# Patient Record
Sex: FEMALE | Race: WHITE | NOT HISPANIC OR LATINO | Employment: OTHER | URBAN - METROPOLITAN AREA
[De-identification: names, ages, dates, MRNs, and addresses within clinical notes are randomized per-mention and may not be internally consistent; named-entity substitution may affect disease eponyms.]

---

## 2017-10-18 ENCOUNTER — HOSPITAL ENCOUNTER (OUTPATIENT)
Dept: MAMMOGRAPHY | Facility: CLINIC | Age: 67
Discharge: HOME | End: 2017-10-18
Attending: OBSTETRICS & GYNECOLOGY | Admitting: OBSTETRICS & GYNECOLOGY
Payer: MEDICARE

## 2017-10-18 DIAGNOSIS — Z12.39 BREAST CANCER SCREENING: ICD-10-CM

## 2017-10-18 PROCEDURE — G0202 SCR MAMMO BI INCL CAD: HCPCS

## 2018-11-13 ENCOUNTER — HOSPITAL ENCOUNTER (OUTPATIENT)
Dept: MAMMOGRAPHY | Facility: CLINIC | Age: 68
Discharge: HOME | End: 2018-11-13
Attending: OBSTETRICS & GYNECOLOGY | Admitting: OBSTETRICS & GYNECOLOGY
Payer: MEDICARE

## 2018-11-13 DIAGNOSIS — Z12.31 VISIT FOR SCREENING MAMMOGRAM: ICD-10-CM

## 2018-11-13 PROCEDURE — 77067 SCR MAMMO BI INCL CAD: CPT

## 2019-06-02 ENCOUNTER — APPOINTMENT (OUTPATIENT)
Dept: GENERAL RADIOLOGY | Facility: CLINIC | Age: 69
End: 2019-06-02
Attending: PHYSICIAN ASSISTANT
Payer: COMMERCIAL

## 2019-06-02 ENCOUNTER — HOSPITAL ENCOUNTER (EMERGENCY)
Facility: CLINIC | Age: 69
Discharge: HOME | End: 2019-06-02
Attending: PHYSICIAN ASSISTANT | Admitting: PHYSICIAN ASSISTANT
Payer: COMMERCIAL

## 2019-06-02 VITALS
RESPIRATION RATE: 16 BRPM | SYSTOLIC BLOOD PRESSURE: 154 MMHG | HEART RATE: 88 BPM | TEMPERATURE: 97.7 F | DIASTOLIC BLOOD PRESSURE: 94 MMHG | OXYGEN SATURATION: 95 %
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
## 2019-06-02 DIAGNOSIS — S42.021A CLOSED DISPLACED FRACTURE OF SHAFT OF RIGHT CLAVICLE, INITIAL ENCOUNTER: ICD-10-CM

## 2019-06-02 PROCEDURE — 73080 X-RAY EXAM OF ELBOW: CPT | Mod: RT

## 2019-06-02 PROCEDURE — 99284 EMERGENCY DEPT VISIT MOD MDM: CPT

## 2019-06-02 PROCEDURE — 73030 X-RAY EXAM OF SHOULDER: CPT | Mod: RT

## 2019-06-02 PROCEDURE — A9270 NON-COVERED ITEM OR SERVICE: HCPCS | Performed by: PHYSICIAN ASSISTANT

## 2019-06-02 PROCEDURE — 25000132 ZZH RX MED GY IP 250 OP 250 PS 637: Performed by: PHYSICIAN ASSISTANT

## 2019-06-02 PROCEDURE — 71101 X-RAY EXAM UNILAT RIBS/CHEST: CPT | Mod: RT

## 2019-06-02 RX ORDER — IBUPROFEN 600 MG/1
600 TABLET, FILM COATED ORAL ONCE
Status: COMPLETED | OUTPATIENT
Start: 2019-06-02 | End: 2019-06-02

## 2019-06-02 RX ORDER — TRAMADOL HYDROCHLORIDE 50 MG/1
25-50 TABLET ORAL EVERY 6 HOURS PRN
Qty: 6 TABLET | Refills: 0 | Status: SHIPPED | OUTPATIENT
Start: 2019-06-02 | End: 2019-06-05

## 2019-06-02 RX ORDER — ACETAMINOPHEN 325 MG/1
650 TABLET ORAL ONCE
Status: COMPLETED | OUTPATIENT
Start: 2019-06-02 | End: 2019-06-02

## 2019-06-02 RX ADMIN — IBUPROFEN 600 MG: 600 TABLET ORAL at 11:27

## 2019-06-02 RX ADMIN — ACETAMINOPHEN 650 MG: 325 TABLET, FILM COATED ORAL at 11:27

## 2019-06-02 ASSESSMENT — ENCOUNTER SYMPTOMS
HEADACHES: 0
JOINT SWELLING: 0
NUMBNESS: 0
FEVER: 0
WEAKNESS: 0
SHORTNESS OF BREATH: 0
ARTHRALGIAS: 1

## 2019-06-02 NOTE — DISCHARGE INSTRUCTIONS
Take Tylenol 1000 mg every 8 hours.  Take Tylenol 600 mg every 6 hours.  Ice as well(help with swelling and pain). If you have breakthrough pain, and take the tramadol.      Discharge Instructions  Extremity Injury    You were seen today for an injury to an extremity (arm, hand, leg, or foot). You may have a bruise, strain, or fracture (broken bone).    Generally, every Emergency Department visit should have a follow-up clinic visit with either a primary or a specialty clinic/provider. Please follow-up as instructed by your emergency provider today.  Return to the Emergency Department right away if:  Your pain seems to change or get worse or there is pain in a new area that wasn?t evaluated today.  Your extremity becomes pale, cool, blue, or numb or tingling past the injury.  You have more drainage, redness or pain in the area of the cut or abrasion.  You have pain that you cannot control with the medicine recommended or prescribed here, or you have pain that seems too much for your injury.  Your child (who is injured) will not stop crying or is much more fussy than normal.  You have new symptoms or anything that worries you.    What to Expect:  Your swelling and pain may be worse the day after your injury, but should not be severe and should start getting better after that. You should not have new symptoms and your pain should not get worse.  You may start to get a bruise over the injured area or below the injured area (bruising can follow gravity).  Your movement and strength should get better with time.  Some injuries may not show up until after you have left the Emergency Department so it is important to follow-up as directed.  Your injury may prevent you from working.  Follow-up with your regular provider to get a work release note.  Pain medications or your injury may make it unsafe to drive or operate machinery.    Home Care:  RICE: Rest, Ice, Compression, Elevation  Rest: Rest your injured area for at least  1-2 days. After that you may start using your extremity again as long as there is not too much pain.   Ice: Apply ice your injured area for 15 minutes at a time, at least 3 times a day. Use a cloth between the ice bag and your skin to prevent frostbite. Do not sleep with an ice pack or heating pad on, since this can cause burns or skin injury.  Compression: You may use an elastic bandage (Ace  Wrap) if it makes you more comfortable. Wrap it just tight enough to provide light compression, like a new pair of socks feels. Loosen the bandage if you have swelling past the bandage.  Elevation: Raise the injured area above the level of your heart as much as possible in the first 1-2 days.    Use Tylenol  (acetaminophen), Motrin (ibuprofen), or Advil  (ibuprofen) for your pain unless you have an allergy or are told not to use these medications by your provider.  Take the medications as instructed on the package. Tylenol  (acetaminophen) is in many prescription medicines and non-prescription medicines--check all of your medicines to be sure you aren?t taking more than 3000 mg per day.  Please follow any other instructions that were discussed with you by your provider.    Stretching/Exercises:  You may have been provided with instructions for stretching or exercises. If your injury was to your arm or shoulder and your provider put you in a sling or an immobilizer, it is important that you take off your immobilizer within 3 days and stretch/move your shoulder, unless your provider specifically tells you to not move your shoulder.  This is to prevent further injury such as a ?frozen shoulder?.     If you were given a prescription for medicine here today, be sure to read all of the information (including the package insert) that comes with your prescription.  This will include important information about the medicine, its side effects, and any warnings that you need to know about.  The pharmacist who fills the prescription can  provide more information and answer questions you may have about the medicine.  If you have questions or concerns that the pharmacist cannot address, please call or return to the Emergency Department.     Remember that you can always come back to the Emergency Department if you are not able to see your regular provider in the amount of time listed above, if you get any new symptoms, or if there is anything that worries you.

## 2019-06-02 NOTE — ED AVS SNAPSHOT
[facility] Emergency Department  [address]  [facility] [address]  Phone:  [phone]  Fax:  [phone]                                    Brii Cook   MRN: [id number]    Department:  [facility] Emergency Department   Date of Visit:  6/2/2019           After Visit Summary Signature Page    I have received my discharge instructions, and my questions have been answered. I have discussed any challenges I see with this plan with the nurse or doctor.    ..........................................................................................................................................  Patient/Patient Representative Signature      ..........................................................................................................................................  Patient Representative Print Name and Relationship to Patient    ..................................................               ................................................  Date                                   Time    ..........................................................................................................................................  Reviewed by Signature/Title    ...................................................              ..............................................  Date                                               Time          22EPIC Rev 08/18

## 2019-06-02 NOTE — ED PROVIDER NOTES
History     Chief Complaint:  Fall, Collar Bone Deformity    HPI   Brii Cook is an otherwise healthy 68 year old female who presents with pain in her right collar bone, ribcage, and elbow after a mechanical fall. The patient reports that she was taking a walk outside this morning when she tripped on a crack in the sidewalk and fell forward. She states that she braced herself with her right arm and stumbled for a bit before ultimately landing on her right side. She denies head injury or loss of consciousness. However, the patient noted significant pain in her right collar bone and right ribcage following the fall, so she stood up and went back to her home where she called EMS. She denies any issues with ambulation. En route to the ED, the patient declined any pain medications. Currently, she states it is painful to breathe secondary to the pain in her ribcage, but denies SOB. The patient also notes significant pain in her right collar bone, but denies shortness of breath, right wrist or shoulder pain, pain in her left arm, numbness, tingling, or other acute symptoms or injuries. She also has an abrasion to her right elbow, but denies any bony tenderness. She denies taking any blood thinners.    Allergies:  Sulfa drugs  Penicillins     Medications:    Trazodone  Wellbutrin    Past Medical History:    Depression  Recurrent corneal erosion    Past Surgical History:    Lithotripsy  Appendectomy    Family History:    History reviewed. No pertinent family history.     Social History:  PCP: Marge Rivas  Presents to the ED with her spouse  Marital Status:  [2]     Review of Systems   Constitutional: Negative for fever.   Respiratory: Negative for shortness of breath.    Musculoskeletal: Positive for arthralgias. Negative for joint swelling.        Right collar bone and ribcage pain   Neurological: Negative for syncope, weakness, numbness and headaches.   All other systems reviewed and are  negative.    Physical Exam     Patient Vitals for the past 24 hrs:   BP Temp Temp src Pulse Heart Rate Resp SpO2   06/02/19 1120 (!) 154/94 97.7  F (36.5  C) Oral 88 88 16 95 %     Physical Exam  General: Alert and oriented.  Appears uncomfortable.  Head:  The scalp, face, and head appear normal.  No evidence of head injury.  Eyes:  Conjunctivae and sclerae are normal    ENT:    The oropharynx is normal    Uvula is in the midline    Neck:  Normal range of motion    There is no midline cervical spine pain/tenderness   CV:  Regular rate and rhythm     Normal S1/S2    Radial pulse intact right wrist.  Capillary refill is brisk in all digits of the right hand.  Resp:  Lungs are clear to auscultation    Non-labored    No rales or wheezing     Equal air entry throughout.  GI:  Abdomen is soft, non-distended    No abdominal tenderness   MS:  Tenderness to the right mid clavicle, with obvious deformity and swelling.  No tenting noted.  No tenderness to the right shoulder, or humerus.  There is no bony tenderness to the right elbow, forearm, or wrist.  No hand or finger tenderness.  Patient can hold fingers and resisted abduction, make the okay sign and hold against resistance, and has good strength with wrist extension in the right upper extremity.  Patient is able to flex and extend at the wrist, and elbow.  Patient is unable to range her shoulder secondary to pain.  Patient has tenderness to the right anferior/anterior ribs.  No flail chest.  No crepitus, ecchymosis, or obvious step-off.  Skin:  Swelling and ecchymosis, and obvious deformity noted to the right clavicle.  Mild swelling noted to the right lateral proximal forearm/elbow.  Neuro: Speech is normal and fluent.  Sensation intact throughout right upper extremity.    Emergency Department Course     Imaging:  Radiographic findings were communicated with the patient and family who voiced understanding of the findings.    XR Shoulder Right G/E 3 Views  There is an  acute midshaft clavicular fracture with  overriding of fracture fragments. The glenohumeral joint space appears  normal.  As read by Radiology.    Elbow XR, G/E views, right  Normal.  As read by Radiology.    Ribs XR, unilat 3 views + PA chest, right  Midshaft clavicular fracture with overriding fracture  fragments present. No pneumothorax. Calcified granuloma seen in the  right lung base. Heart size and pulmonary vasculature are within  normal limits for AP supine technique. Lungs are clear. Detail views  of the right ribs do not show any evidence for any acute rib fracture.  As read by Radiology.    Interventions:  1127: Tylenol 650 mg PO  1127: Ibuprofen 600 mg PO    Emergency Department Course:  The patient arrived in the emergency department via EMS.  Past medical records, nursing notes, and vitals reviewed.  1125: I performed an exam of the patient and obtained history, as documented above.  The patient was sent for a right shoulder X-ray, a right elbow x-ray, and a right rib x-ray while in the emergency department, findings above.    1236: I rechecked the patient. Findings and plan explained to the Patient. Patient discharged home with instructions regarding supportive care, medications, and reasons to return. The importance of close follow-up was reviewed.      Impression & Plan    Medical Decision Making:  Brii Cook is an otherwise healthy 68 year old female presents for evaluation after a mechanical fall.  She states that she tripped over uneven sidewalk, and landed on her right side.  She complains of right clavicular, and right anterior rib pain with movement.  On exam, she appears uncomfortable, but refuses any pain medications.  There is an obvious deformity to the right clavicle, and mild swelling to the right elbow. She also has tenderness to the right anterior/inferior rib cage.  Patient is equal air entry throughout, doubt pneumothorax.  Patient has no lower extremity complaints or left  upper extremity complaints.  Patient can range the wrist and elbow without difficulty.  X-ray of the right shoulder demonstrates a midshaft clavicular fracture, but no evidence of dislocation.  X-ray of the chest, right ribs negative for any obvious displaced fracture.  X-ray of the elbow is negative for any abnormality.  CMS is intact.  She did not hit her head and is not anticoagulated.  C-spine is clinically.  Therefore I do not believe further imaging/CT is needed at this time.  Given the clavicular fracture, Dr. Felder was asked to staff this patient with me.  Please refer to his note for further details and fracture care.  Ultimately, the patient was placed in a sling for comfort.  She has no other symptoms or complaints to suggest need for further work-up at this time.  She is asked to take Tylenol and ibuprofen and was given a prescription for tramadol for any breakthrough pain.  She also asked to ice the injured area.  She is asked to follow-up with orthopedics in 2 to 3 days for recheck.  She is asked return immediately for any uncontrolled pain, weakness, numbness, tingling, or any other concerns.  All questions were answered prior to discharge.  The patient understands and agrees to this plan.  Critical Care time:  none    Diagnosis:    ICD-10-CM   1. Fall, initial encounter W19.XXXA   2. Closed displaced fracture of shaft of right clavicle, initial encounter S42.021A     Disposition: Discharged to home    Discharge Medications:  traMADol 50 MG tablet  Commonly known as:  ULTRAM  25-50 mg, Oral, EVERY 6 HOURS PRN       Henrik Ulloa  6/2/2019   [facility] EMERGENCY DEPARTMENT  Henrik MORALES am serving as a scribe at 11:25 AM on 6/2/2019 to document services personally performed by Park Mueller PA-C based on my observations and the provider's statements to me.      Park Mueller PA-C  06/02/19 [id number]

## 2019-06-02 NOTE — ED NOTES
Bed: ED27  Expected date: 6/2/19  Expected time: 11:07 AM  Means of arrival:   Comments:  bv-3 fall collar bone frac?

## 2019-06-02 NOTE — ED NOTES
"Pt requested only to be given tylenol and ibuprofen because she is sensitive to stronger medications. When writer went to administered meds pt stated \"both at the same time? Oh that will knock me out.\" Writer explained that tylenol and ibuprofen are both safe to be given at the same time and that they are non-narcotic and therefore should not have any sort of sedative effect. Pt verbalized understanding.     Pt visibly very uncomfortable with any movement, writer advised pt to notify if she needed any stronger pain medication. Pt verbalized understanding.   "

## 2019-06-02 NOTE — ED TRIAGE NOTES
Pt arrives via EMS. Pt was walking and tripped over a crack in the sidewalk, now c/o right arm pain, shoulder pain, and right sided rib pain. Pt alert, oriented x3 ABCs intact

## 2019-06-02 NOTE — ED PROVIDER NOTES
Emergency Department Attending Supervision Note  6/2/2019  12:51 PM      I evaluated this patient in conjunction with Park Mueller PA-C       Briefly, the patient presented with right shoulder and clavicular pain after tripping and falling and landing on the right shoulder.  Patient denies any numbness tingling or weakness.  She has normal range of motion without pain of the right elbow wrist hand and fingers.  No syncope.  She denies any head injury or loss of consciousness.  No shortness of breath or chest pain.  She denies any other extremity injuries.  No neck pain or back pain.      On my exam, patient is well-appearing alert and oriented appropriately.  Strength is 5 out of 5 and intact in the bilateral upper extremities.  Obvious deformity and swelling over the distal right clavicle.  The remainder of the right upper extremity is otherwise nontender with full range of motion.  Radial pulses 2+ and symmetric bilaterally.   strength 5 out of 5 and intact.  Strength and sensation intact in all peripheral distributions of the hand.  No numbness over the right lateral deltoid.    Results:  All ED results are personally reviewed by myself.  There is an acute midshaft clavicular fracture with overriding of the fracture fragments.  The glenohumeral joint appears normal.    ED course:  Patient was seen and evaluated with Park.  ED work-up was performed.  Radiographs consistent with right midshaft clavicular fracture.  No evidence of neurovascular compromise or injury.  No head injury or evidence of other significant traumatic injuries to the thorax or abdomen.  Patient was provided with sling for comfort and recommended to follow-up with orthopedics and her primary care physician.    My impression is acute right midshaft clavicular fracture        Diagnosis    ICD-10-CM    1. Fall, initial encounter W19.XXXA    2. Closed displaced fracture of shaft of right clavicle, initial encounter S42.021A              Pravin Felder MD  06/02/19 1946

## 2020-07-20 ENCOUNTER — HOSPITAL ENCOUNTER (OUTPATIENT)
Dept: MAMMOGRAPHY | Facility: CLINIC | Age: 70
Discharge: HOME | End: 2020-07-20
Attending: OBSTETRICS & GYNECOLOGY | Admitting: OBSTETRICS & GYNECOLOGY
Payer: COMMERCIAL

## 2020-07-20 DIAGNOSIS — Z12.31 VISIT FOR SCREENING MAMMOGRAM: ICD-10-CM

## 2020-07-20 PROCEDURE — 77067 SCR MAMMO BI INCL CAD: CPT

## 2021-03-09 ENCOUNTER — IMMUNIZATION (OUTPATIENT)
Dept: NURSING | Facility: CLINIC | Age: 71
End: 2021-03-09
Payer: COMMERCIAL

## 2021-03-09 PROCEDURE — 91301 PR COVID VAC MODERNA 100 MCG/0.5 ML IM: CPT

## 2021-03-09 PROCEDURE — 0011A PR COVID VAC MODERNA 100 MCG/0.5 ML IM: CPT

## 2021-03-21 ENCOUNTER — HEALTH MAINTENANCE LETTER (OUTPATIENT)
Age: 71
End: 2021-03-21

## 2021-04-06 ENCOUNTER — IMMUNIZATION (OUTPATIENT)
Dept: NURSING | Facility: CLINIC | Age: 71
End: 2021-04-06
Attending: INTERNAL MEDICINE
Payer: COMMERCIAL

## 2021-04-06 PROCEDURE — 91301 PR COVID VAC MODERNA 100 MCG/0.5 ML IM: CPT

## 2021-04-06 PROCEDURE — 0012A PR COVID VAC MODERNA 100 MCG/0.5 ML IM: CPT

## 2021-09-05 ENCOUNTER — HEALTH MAINTENANCE LETTER (OUTPATIENT)
Age: 71
End: 2021-09-05

## 2022-04-17 ENCOUNTER — HEALTH MAINTENANCE LETTER (OUTPATIENT)
Age: 72
End: 2022-04-17

## 2022-09-02 ENCOUNTER — APPOINTMENT (OUTPATIENT)
Dept: CT IMAGING | Facility: CLINIC | Age: 72
End: 2022-09-02
Payer: COMMERCIAL

## 2022-09-02 ENCOUNTER — HOSPITAL ENCOUNTER (EMERGENCY)
Facility: CLINIC | Age: 72
Discharge: HOME | End: 2022-09-02
Attending: EMERGENCY MEDICINE | Admitting: EMERGENCY MEDICINE
Payer: COMMERCIAL

## 2022-09-02 VITALS
TEMPERATURE: 97.9 F | RESPIRATION RATE: 20 BRPM | OXYGEN SATURATION: 96 % | DIASTOLIC BLOOD PRESSURE: 89 MMHG | SYSTOLIC BLOOD PRESSURE: 150 MMHG | HEART RATE: 79 BPM

## 2022-09-02 DIAGNOSIS — M62.81 GENERALIZED MUSCLE WEAKNESS: Primary | ICD-10-CM

## 2022-09-02 DIAGNOSIS — Z87.440 PERSONAL HISTORY OF URINARY TRACT INFECTION: ICD-10-CM

## 2022-09-02 LAB
ALBUMIN SERPL BCG-MCNC: 3.6 G/DL (ref 3.5–5.2)
ALBUMIN UR-MCNC: NEGATIVE MG/DL
ALP SERPL-CCNC: 86 U/L (ref 35–104)
ALT SERPL W P-5'-P-CCNC: 18 U/L (ref 10–35)
ANION GAP SERPL CALCULATED.3IONS-SCNC: 8 MMOL/L (ref 7–15)
APPEARANCE UR: CLEAR
AST SERPL W P-5'-P-CCNC: 24 U/L (ref 10–35)
BACTERIA #/AREA URNS HPF: ABNORMAL /HPF
BASOPHILS # BLD AUTO: 0 10E3/UL (ref 0–0.2)
BASOPHILS NFR BLD AUTO: 0 %
BILIRUB SERPL-MCNC: 0.3 MG/DL
BILIRUB UR QL STRIP: NEGATIVE
BUN SERPL-MCNC: 14.5 MG/DL (ref 8–23)
CALCIUM SERPL-MCNC: 8.6 MG/DL (ref 8.8–10.2)
CHLORIDE SERPL-SCNC: 107 MMOL/L (ref 98–107)
COLOR UR AUTO: ABNORMAL
CREAT BLD-MCNC: 0.7 MG/DL (ref 0.5–1)
CREAT SERPL-MCNC: 0.8 MG/DL (ref 0.51–0.95)
DEPRECATED HCO3 PLAS-SCNC: 27 MMOL/L (ref 22–29)
EOSINOPHIL # BLD AUTO: 0.1 10E3/UL (ref 0–0.7)
EOSINOPHIL NFR BLD AUTO: 3 %
ERYTHROCYTE [DISTWIDTH] IN BLOOD BY AUTOMATED COUNT: 12.8 % (ref 10–15)
FLUAV RNA SPEC QL NAA+PROBE: NEGATIVE
FLUBV RNA RESP QL NAA+PROBE: NEGATIVE
GFR SERPL CREATININE-BSD FRML MDRD: 77 ML/MIN/1.73M2
GFR SERPL CREATININE-BSD FRML MDRD: >60 ML/MIN/1.73M2
GLUCOSE SERPL-MCNC: 132 MG/DL (ref 70–99)
GLUCOSE UR STRIP-MCNC: NEGATIVE MG/DL
HCT VFR BLD AUTO: 44.4 % (ref 35–47)
HGB BLD-MCNC: 14.2 G/DL (ref 11.7–15.7)
HGB UR QL STRIP: NEGATIVE
IMM GRANULOCYTES # BLD: 0 10E3/UL
IMM GRANULOCYTES NFR BLD: 1 %
KETONES UR STRIP-MCNC: NEGATIVE MG/DL
LACTATE SERPL-SCNC: 1.4 MMOL/L (ref 0.7–2)
LEUKOCYTE ESTERASE UR QL STRIP: NEGATIVE
LYMPHOCYTES # BLD AUTO: 1 10E3/UL (ref 0.8–5.3)
LYMPHOCYTES NFR BLD AUTO: 22 %
MCH RBC QN AUTO: 31.1 PG (ref 26.5–33)
MCHC RBC AUTO-ENTMCNC: 32 G/DL (ref 31.5–36.5)
MCV RBC AUTO: 97 FL (ref 78–100)
MONOCYTES # BLD AUTO: 0.5 10E3/UL (ref 0–1.3)
MONOCYTES NFR BLD AUTO: 10 %
MUCOUS THREADS #/AREA URNS LPF: PRESENT /LPF
NEUTROPHILS # BLD AUTO: 2.8 10E3/UL (ref 1.6–8.3)
NEUTROPHILS NFR BLD AUTO: 64 %
NITRATE UR QL: NEGATIVE
NRBC # BLD AUTO: 0 10E3/UL
NRBC BLD AUTO-RTO: 0 /100
PH UR STRIP: 7 [PH] (ref 5–7)
PLATELET # BLD AUTO: 205 10E3/UL (ref 150–450)
POTASSIUM SERPL-SCNC: 3.7 MMOL/L (ref 3.4–5.3)
PROT SERPL-MCNC: 6.4 G/DL (ref 6.4–8.3)
RBC # BLD AUTO: 4.57 10E6/UL (ref 3.8–5.2)
RBC URINE: <1 /HPF
RSV RNA SPEC NAA+PROBE: NEGATIVE
SARS-COV-2 RNA RESP QL NAA+PROBE: NEGATIVE
SODIUM SERPL-SCNC: 142 MMOL/L (ref 136–145)
SP GR UR STRIP: 1.01 (ref 1–1.03)
SQUAMOUS EPITHELIAL: <1 /HPF
UROBILINOGEN UR STRIP-MCNC: NORMAL MG/DL
WBC # BLD AUTO: 4.4 10E3/UL (ref 4–11)
WBC URINE: <1 /HPF

## 2022-09-02 PROCEDURE — C9803 HOPD COVID-19 SPEC COLLECT: HCPCS

## 2022-09-02 PROCEDURE — 82565 ASSAY OF CREATININE: CPT

## 2022-09-02 PROCEDURE — 258N000003 HC RX IP 258 OP 636

## 2022-09-02 PROCEDURE — 80053 COMPREHEN METABOLIC PANEL: CPT

## 2022-09-02 PROCEDURE — 74176 CT ABD & PELVIS W/O CONTRAST: CPT

## 2022-09-02 PROCEDURE — 99284 EMERGENCY DEPT VISIT MOD MDM: CPT

## 2022-09-02 PROCEDURE — 85025 COMPLETE CBC W/AUTO DIFF WBC: CPT

## 2022-09-02 PROCEDURE — 96361 HYDRATE IV INFUSION ADD-ON: CPT

## 2022-09-02 PROCEDURE — 96360 HYDRATION IV INFUSION INIT: CPT

## 2022-09-02 PROCEDURE — 87637 SARSCOV2&INF A&B&RSV AMP PRB: CPT

## 2022-09-02 PROCEDURE — 81001 URINALYSIS AUTO W/SCOPE: CPT

## 2022-09-02 PROCEDURE — 87040 BLOOD CULTURE FOR BACTERIA: CPT

## 2022-09-02 PROCEDURE — 36415 COLL VENOUS BLD VENIPUNCTURE: CPT

## 2022-09-02 PROCEDURE — 83605 ASSAY OF LACTIC ACID: CPT

## 2022-09-02 RX ORDER — IOPAMIDOL 755 MG/ML
500 INJECTION, SOLUTION INTRAVASCULAR ONCE
Status: DISCONTINUED | OUTPATIENT
Start: 2022-09-02 | End: 2022-09-02

## 2022-09-02 RX ADMIN — SODIUM CHLORIDE 1000 ML: 9 INJECTION, SOLUTION INTRAVENOUS at 12:13

## 2022-09-02 ASSESSMENT — ENCOUNTER SYMPTOMS
ABDOMINAL PAIN: 0
SHORTNESS OF BREATH: 0
NAUSEA: 0
BACK PAIN: 1
DIARRHEA: 0
CHILLS: 1
FEVER: 0
APPETITE CHANGE: 1
FATIGUE: 1
FLANK PAIN: 0
BLOOD IN STOOL: 0
LIGHT-HEADEDNESS: 0
DYSURIA: 0
WEAKNESS: 1
VOMITING: 0
COUGH: 0
DIZZINESS: 0
HEMATURIA: 0

## 2022-09-02 ASSESSMENT — ACTIVITIES OF DAILY LIVING (ADL)
ADLS_ACUITY_SCORE: 35
ADLS_ACUITY_SCORE: 35

## 2022-09-02 NOTE — ED PROVIDER NOTES
ED ATTENDING PHYSICIAN NOTE:   I evaluated this patient in conjunction with Shayy Robledo PA-C  I have participated in the care of the patient and personally performed key elements of the history, exam, and medical decision making.      HPI:   Brii Cook is a 72 year old female with a history of recurrent UTIs currently on ciprofloxacin who presents to the emergency department with generalized weakness, low back pain and chills. She had recent treatment with keflex for e.coli UTI but it recurred. She felt generalized weakness this morning to the extent that she had difficulty getting out of bed. She denies one sided symptoms, speech difficulty, chest pain or dizziness. No numbnes, tingling or incontinence. No current urinary symptoms.        EXAM:   BP (!) 164/87   Pulse 79   Temp 97.9  F (36.6  C) (Temporal)   Resp 20   SpO2 97%   General: Elderly female sitting upright  Eyes: PERRL, Conjunctive within normal limits. No scleral icterus.   ENT: Moist mucous membranes, oropharynx clear.   CV: Normal S1S2, no murmur, rub or gallop. Regular rate and rhythm  Resp: Clear to auscultation bilaterally, no wheezes, rales or rhonchi. Normal respiratory effort.  GI: Abdomen is soft and nondistended. No significant tenderness to palpation. No palpable masses. No rebound or guarding.  MSK: No back tenderness to palpation. No edema of the extrtemities Normal active range of motion.  Skin: Warm and dry. No rashes or lesions or ecchymoses on visible skin.  Neuro: Alert and oriented. Responds appropriately to all questions and commands. No focal findings appreciated. Normal muscle tone.  Psych: Normal mood and affect.      MEDICAL DECISION MAKING/ASSESSMENT AND PLAN:   Brii Cook is a 72 year old female who presents for evaluation of weakness and generalized low back pain with chills in the setting of current tx of UTI that was recurrent.  No fever or signs of neurologic compromise.  The workup here in the  emergency room was to evaluate for infections, metabolic, electrolyte, neurologic complications. She has no fever and is not toxic appearing. She has no focal neurologic deficits or back tenderness. UA is normal and all labs are reassuring. CT abd/pelv unremarkable for acute pathology.   In addition, I do not believe symptoms are due to CVA, TIA, myasthesia gravis, myopathy or acute coronary syndromes and there are no indications at this point for a further workup with a benign history, exam and laboratory tests. She ate and ambulated in ED and did well.  I believe supportive outpatient management is indicated; will have them followup with their primary care doctor in 1-2 days or as early as possible for a recheck. Return for any additional symptoms or worsening weakness.  She and her  felt comfortable with this plan. Although cipro could be contributing to her symptoms, no indication including tendonitis symptoms to suggest discontinuation would be indicated.      DIAGNOSIS:     ICD-10-CM    1. Generalized muscle weakness  M62.81      DISPOSITION:   Discharged to home with  in stable condition.        9/2/2022  [facility] EMERGENCY DEPT     Isabela Gore MD  09/02/22 0157

## 2022-09-02 NOTE — DISCHARGE INSTRUCTIONS
You were seen and evaluated here in the emergency department for generalized weakness.  Urine sample was collected here and did not show recurrent evidence for infection.  You should complete your course of antibiotics as prescribed.  We did send your urine for culture and you will get contacted if this grows anything out.  Basic lab work was obtained and fortunately you did not have any elevated white cell count worrisome for infection and you did not have a fever.  You are not anemic.  You had no electrolyte abnormality.  Your kidney function and liver tests looked good.  We checked a lactate, reassuring that you do not have sepsis.  COVID test was obtained and negative.  CT of your abdomen and pelvis showed no worrisome abnormalities.  Please follow-up with primary care for recheck after this ED visit.  In the meantime, please return to the emergency department if you have any new or concerning symptoms including development of a fever, abdominal pain, chest pain, shortness of breath, recurrent urinary symptoms or anything else new or worrisome.

## 2022-09-02 NOTE — ED TRIAGE NOTES
Aug 11th, kidney infection. On her 2nd dose of Abx. Woke up shaky, unable to ambulate, weakness. Fluids started by EMS

## 2022-09-02 NOTE — ED PROVIDER NOTES
"  History     Chief Complaint:  Weakness    HPI   Brii Cook is a 72 year old female with depression and repeat urinary tract infections who presents to the emergency department for evaluation of generalized weakness, back pain and chills.  The patient was seen at Urgent Care 8/11, found to have a UTI and was put on Keflex. Culture returned pan-sensitive to e. coli. Treated for 10 days.  She was seen again 8/28, found to have UTI (nitrite positive, leukocyte esterase) treated with Ciprofloxacin. Culture showed sensitivity to Ciprofloxacin.  She has been compliant with the Cipro and is in the middle of her 7-day course.  She states since the 28th, alternating days she has felt better and worse.  This morning, she woke up and felt extremely weak and felt it was difficult to walk.  She has been very shaky and has had chills.  She states she has intermittent pain across her lower back and feels generally \"yucky\".  She has no measured fevers at home.  No nausea, vomiting, abdominal pain, diarrhea or blood in her stool.  She complains of a general loss of appetite and states that everything tastes bland.  She denies any urinary burning.  With her back pain, she has no urinary incontinence or retention, no bowel incontinence and no numbness or tingling in her groin.  She states her  tested positive for COVID 1 month ago, but she was negative on home test.  She has no cough, cold, sore throat or headache.  No chest pain or shortness of breath.      Review of Systems   Constitutional: Positive for appetite change, chills and fatigue. Negative for fever.   HENT: Negative for congestion.    Respiratory: Negative for cough and shortness of breath.    Cardiovascular: Negative for chest pain.   Gastrointestinal: Negative for abdominal pain, blood in stool, diarrhea, nausea and vomiting.   Genitourinary: Negative for decreased urine volume, dysuria, flank pain, hematuria and urgency.   Musculoskeletal: Positive " for back pain. Negative for gait problem.   Neurological: Positive for weakness. Negative for dizziness and light-headedness.   All other systems reviewed and are negative.    Allergies:  Ampicillin  Cefaclor  Codeine  Diatrizoate meglumine  Penicillin G  Sulfa drugs    Medications:    Bupropion  Ciprofloxacin     Past Medical History:    Benign neoplasm of sigmoid colon  Low bone density  Displaced fracture of shaft of right clavicle  Depression  Corneal erosion    Past Surgical History:    Lithotripsy   Appendectomy     Family History:    Mother- dementia  Father- esophageal cancer    Social History:  Patient present via private vehicle  Marge Rivas  The patient presents to the ED with her     Physical Exam     Patient Vitals for the past 24 hrs:   BP Temp Temp src Pulse Resp SpO2   09/02/22 1330 (!) 150/89 -- -- 79 -- 96 %   09/02/22 1315 (!) 164/83 -- -- 80 -- 97 %   09/02/22 1300 (!) 164/87 -- -- 79 -- 97 %   09/02/22 1130 (!) 153/86 -- -- 90 -- 97 %   09/02/22 1102 (!) 133/95 97.9  F (36.6  C) Temporal 98 20 98 %       Physical Exam  General: Pleasant elderly female laying on [facility].  Appears tired, but nontoxic.  HENT: Patient wearing face mask. When taken off, mucous membranes appear moist.  Eyes: Conjunctive and sclera clear.  CV: Regular rate and rhythm. Normal S1, S2. No appreciable murmurs, gallops or rubs.  Resp: Lungs clear to auscultation bilaterally. Normal respiratory effort. Speaks in full sentences. No stridor or cough observed.  GI: Abdomen soft, non distended.  Mild suprapubic tenderness.  : No CVA tenderness.  MSK: Moves all extremities without difficulty.  No midline T-spine or L-spine tenderness to palpation.  No tenderness to the lumbar paraspinal musculature.  Skin: Warm and dry.  No rashes.  Neuro: Awake, alert, orientedx3. Generalized weakness, no focal deficits.  Psych: Cooperative. Normal affect.    Emergency Department Course     Imaging:  CT Abdomen Pelvis  w/o Contrast   Final Result   IMPRESSION: Within the limitation of noncontrast exam, there is;   1. No acute pathology in the abdomen and pelvis.   2. Colonic diverticulosis without diverticulitis.      AMARI GOMEZ MD            SYSTEM ID:  [id number]        Report per radiology    Laboratory:  Labs Ordered and Resulted from Time of ED Arrival to Time of ED Departure   ROUTINE UA WITH MICROSCOPIC REFLEX TO CULTURE - Abnormal       Result Value    Color Urine Straw      Appearance Urine Clear      Glucose Urine Negative      Bilirubin Urine Negative      Ketones Urine Negative      Specific Gravity Urine 1.006      Blood Urine Negative      pH Urine 7.0      Protein Albumin Urine Negative      Urobilinogen Urine Normal      Nitrite Urine Negative      Leukocyte Esterase Urine Negative      Bacteria Urine Few (*)     Mucus Urine Present (*)     RBC Urine <1      WBC Urine <1      Squamous Epithelials Urine <1     COMPREHENSIVE METABOLIC PANEL - Abnormal    Sodium 142      Potassium 3.7      Creatinine 0.80      Urea Nitrogen 14.5      Chloride 107      Carbon Dioxide (CO2) 27      Anion Gap 8      Glucose 132 (*)     Calcium 8.6 (*)     Protein Total 6.4      Albumin 3.6      Bilirubin Total 0.3      Alkaline Phosphatase 86      AST 24      ALT 18      GFR Estimate 77     LACTIC ACID WHOLE BLOOD - Normal    Lactic Acid 1.4     ISTAT CREATININE POCT - Normal    Creatinine POCT 0.7      GFR, ESTIMATED POCT >60     INFLUENZA A/B & SARS-COV2 PCR MULTIPLEX - Normal    Influenza A PCR Negative      Influenza B PCR Negative      RSV PCR Negative      SARS CoV2 PCR Negative     CBC WITH PLATELETS AND DIFFERENTIAL    WBC Count 4.4      RBC Count 4.57      Hemoglobin 14.2      Hematocrit 44.4      MCV 97      MCH 31.1      MCHC 32.0      RDW 12.8      Platelet Count 205      % Neutrophils 64      % Lymphocytes 22      % Monocytes 10      % Eosinophils 3      % Basophils 0      % Immature Granulocytes 1      NRBCs per 100  WBC 0      Absolute Neutrophils 2.8      Absolute Lymphocytes 1.0      Absolute Monocytes 0.5      Absolute Eosinophils 0.1      Absolute Basophils 0.0      Absolute Immature Granulocytes 0.0      Absolute NRBCs 0.0     BLOOD CULTURE       Emergency Department Course:    Reviewed:  I reviewed nursing notes, vitals and past medical history    Assessments:  1130 I obtained history and examined the patient as noted above.   1201 I rechecked the patient and explained that we will obtain CT abdomen, pelvis. I helped her change into a gown.  1321 I rechecked the patient and explained findings  1352    Nursing reported that the patient was able to ambulate without difficulty here in the department and she was able to eat and drink.  My attending provider also saw and evaluated the patient prior to discharge.    Interventions:  Medications   0.9% sodium chloride BOLUS (0 mLs Intravenous Stopped 9/2/22 1400)     Disposition:  The patient was discharged to home.     Impression & Plan      Medical Decision Making:  Angie is a pleasant 72-year-old female with a history of recurrent UTIs who presented to the emergency department for generalized weakness per detailed HPI above.  Urine was obtained and showed no evidence of infection.  This was sent for culture.  The patient is in the middle of a course of Cipro for a UTI she was found to have August 28 and she will finish the course.  CBC showed no leukocytosis concerning for infection, she was afebrile and her lactate was not elevated.  Blood cultures were drawn and are pending.  Metabolic panel showed no acute electrolyte arrangement, worrisome decline in kidney function or abnormal LFTs.  COVID swab was negative.  CT abdomen pelvis showed no acute abnormality.  The patient will follow-up closely with primary care for recheck.  She was encouraged to rest and stay well-hydrated.  Vital signs were unremarkable at time of discharge.  The patient was able to ambulate in the  department and she tolerated eating solid food without difficulty.  She understands she should return to the emergency department if she develops fever, abdominal pain, chest pain, shortness of breath, recurrent urinary symptoms or any other new or concerning problems.    Diagnosis:    ICD-10-CM    1. Generalized muscle weakness  M62.81 CANCELED: Primary Care Referral   2. Personal history of urinary tract infection  Z87.440        Shayy Robledo PA-C  [facility]  I saw and evaluated this patient under attending provider Dr. Gore.       Shayy Robledo PA-C  09/02/22 1401

## 2022-09-04 ENCOUNTER — HOSPITAL ENCOUNTER (EMERGENCY)
Facility: CLINIC | Age: 72
Discharge: HOME | End: 2022-09-04
Attending: EMERGENCY MEDICINE | Admitting: EMERGENCY MEDICINE
Payer: COMMERCIAL

## 2022-09-04 ENCOUNTER — APPOINTMENT (OUTPATIENT)
Dept: CT IMAGING | Facility: CLINIC | Age: 72
End: 2022-09-04
Attending: EMERGENCY MEDICINE
Payer: COMMERCIAL

## 2022-09-04 VITALS
RESPIRATION RATE: 16 BRPM | TEMPERATURE: 97.9 F | OXYGEN SATURATION: 94 % | SYSTOLIC BLOOD PRESSURE: 127 MMHG | DIASTOLIC BLOOD PRESSURE: 82 MMHG | HEART RATE: 75 BPM

## 2022-09-04 DIAGNOSIS — R51.9 NONINTRACTABLE HEADACHE, UNSPECIFIED CHRONICITY PATTERN, UNSPECIFIED HEADACHE TYPE: ICD-10-CM

## 2022-09-04 DIAGNOSIS — G93.0 ARACHNOID CYST: Primary | ICD-10-CM

## 2022-09-04 DIAGNOSIS — R53.1 WEAKNESS GENERALIZED: ICD-10-CM

## 2022-09-04 LAB
ALBUMIN UR-MCNC: NEGATIVE MG/DL
ANION GAP SERPL CALCULATED.3IONS-SCNC: 10 MMOL/L (ref 7–15)
APPEARANCE UR: CLEAR
BASOPHILS # BLD AUTO: 0 10E3/UL (ref 0–0.2)
BASOPHILS NFR BLD AUTO: 0 %
BILIRUB UR QL STRIP: NEGATIVE
BUN SERPL-MCNC: 11.7 MG/DL (ref 8–23)
CALCIUM SERPL-MCNC: 8.6 MG/DL (ref 8.8–10.2)
CHLORIDE SERPL-SCNC: 106 MMOL/L (ref 98–107)
COLOR UR AUTO: ABNORMAL
CREAT SERPL-MCNC: 0.68 MG/DL (ref 0.51–0.95)
DEPRECATED HCO3 PLAS-SCNC: 23 MMOL/L (ref 22–29)
EOSINOPHIL # BLD AUTO: 0.1 10E3/UL (ref 0–0.7)
EOSINOPHIL NFR BLD AUTO: 2 %
ERYTHROCYTE [DISTWIDTH] IN BLOOD BY AUTOMATED COUNT: 12.6 % (ref 10–15)
GFR SERPL CREATININE-BSD FRML MDRD: >90 ML/MIN/1.73M2
GLUCOSE SERPL-MCNC: 115 MG/DL (ref 70–99)
GLUCOSE UR STRIP-MCNC: NEGATIVE MG/DL
HCT VFR BLD AUTO: 41.8 % (ref 35–47)
HGB BLD-MCNC: 13.6 G/DL (ref 11.7–15.7)
HGB UR QL STRIP: NEGATIVE
IMM GRANULOCYTES # BLD: 0 10E3/UL
IMM GRANULOCYTES NFR BLD: 0 %
KETONES UR STRIP-MCNC: NEGATIVE MG/DL
LEUKOCYTE ESTERASE UR QL STRIP: NEGATIVE
LYMPHOCYTES # BLD AUTO: 0.9 10E3/UL (ref 0.8–5.3)
LYMPHOCYTES NFR BLD AUTO: 17 %
MCH RBC QN AUTO: 31 PG (ref 26.5–33)
MCHC RBC AUTO-ENTMCNC: 32.5 G/DL (ref 31.5–36.5)
MCV RBC AUTO: 95 FL (ref 78–100)
MONOCYTES # BLD AUTO: 0.5 10E3/UL (ref 0–1.3)
MONOCYTES NFR BLD AUTO: 9 %
MUCOUS THREADS #/AREA URNS LPF: PRESENT /LPF
NEUTROPHILS # BLD AUTO: 4.1 10E3/UL (ref 1.6–8.3)
NEUTROPHILS NFR BLD AUTO: 72 %
NITRATE UR QL: NEGATIVE
NRBC # BLD AUTO: 0 10E3/UL
NRBC BLD AUTO-RTO: 0 /100
PH UR STRIP: 6.5 [PH] (ref 5–7)
PLATELET # BLD AUTO: 183 10E3/UL (ref 150–450)
POTASSIUM SERPL-SCNC: 3.6 MMOL/L (ref 3.4–5.3)
RBC # BLD AUTO: 4.39 10E6/UL (ref 3.8–5.2)
RBC URINE: 1 /HPF
SODIUM SERPL-SCNC: 139 MMOL/L (ref 136–145)
SP GR UR STRIP: 1.01 (ref 1–1.03)
SQUAMOUS EPITHELIAL: <1 /HPF
UROBILINOGEN UR STRIP-MCNC: NORMAL MG/DL
WBC # BLD AUTO: 5.6 10E3/UL (ref 4–11)
WBC URINE: 1 /HPF

## 2022-09-04 PROCEDURE — 99203 OFFICE O/P NEW LOW 30 MIN: CPT | Performed by: NURSE PRACTITIONER

## 2022-09-04 PROCEDURE — 36415 COLL VENOUS BLD VENIPUNCTURE: CPT | Performed by: EMERGENCY MEDICINE

## 2022-09-04 PROCEDURE — 70450 CT HEAD/BRAIN W/O DYE: CPT

## 2022-09-04 PROCEDURE — 82310 ASSAY OF CALCIUM: CPT | Performed by: EMERGENCY MEDICINE

## 2022-09-04 PROCEDURE — 250N000011 HC RX IP 250 OP 636: Performed by: EMERGENCY MEDICINE

## 2022-09-04 PROCEDURE — 85049 AUTOMATED PLATELET COUNT: CPT | Performed by: EMERGENCY MEDICINE

## 2022-09-04 PROCEDURE — 96374 THER/PROPH/DIAG INJ IV PUSH: CPT

## 2022-09-04 PROCEDURE — 96375 TX/PRO/DX INJ NEW DRUG ADDON: CPT

## 2022-09-04 PROCEDURE — 250N000009 HC RX 250: Performed by: EMERGENCY MEDICINE

## 2022-09-04 PROCEDURE — 258N000003 HC RX IP 258 OP 636: Performed by: EMERGENCY MEDICINE

## 2022-09-04 PROCEDURE — 99285 EMERGENCY DEPT VISIT HI MDM: CPT | Mod: 25

## 2022-09-04 PROCEDURE — 81001 URINALYSIS AUTO W/SCOPE: CPT | Performed by: EMERGENCY MEDICINE

## 2022-09-04 PROCEDURE — 96361 HYDRATE IV INFUSION ADD-ON: CPT

## 2022-09-04 RX ORDER — LORAZEPAM 2 MG/ML
1 INJECTION INTRAMUSCULAR ONCE
Status: COMPLETED | OUTPATIENT
Start: 2022-09-04 | End: 2022-09-04

## 2022-09-04 RX ORDER — KETOROLAC TROMETHAMINE 15 MG/ML
15 INJECTION, SOLUTION INTRAMUSCULAR; INTRAVENOUS ONCE
Status: COMPLETED | OUTPATIENT
Start: 2022-09-04 | End: 2022-09-04

## 2022-09-04 RX ORDER — IOPAMIDOL 755 MG/ML
500 INJECTION, SOLUTION INTRAVASCULAR ONCE
Status: DISCONTINUED | OUTPATIENT
Start: 2022-09-04 | End: 2022-09-04 | Stop reason: CLARIF

## 2022-09-04 RX ORDER — LIDOCAINE HYDROCHLORIDE 40 MG/ML
.5-1 INJECTION, SOLUTION RETROBULBAR ONCE
Status: COMPLETED | OUTPATIENT
Start: 2022-09-04 | End: 2022-09-04

## 2022-09-04 RX ADMIN — PROCHLORPERAZINE EDISYLATE 5 MG: 5 INJECTION INTRAMUSCULAR; INTRAVENOUS at 04:13

## 2022-09-04 RX ADMIN — LORAZEPAM 1 MG: 2 INJECTION INTRAMUSCULAR; INTRAVENOUS at 04:12

## 2022-09-04 RX ADMIN — SODIUM CHLORIDE 1000 ML: 9 INJECTION, SOLUTION INTRAVENOUS at 04:11

## 2022-09-04 RX ADMIN — LIDOCAINE HYDROCHLORIDE 1 ML: 40 INJECTION, SOLUTION RETROBULBAR; TOPICAL at 04:15

## 2022-09-04 RX ADMIN — KETOROLAC TROMETHAMINE 15 MG: 15 INJECTION, SOLUTION INTRAMUSCULAR; INTRAVENOUS at 04:11

## 2022-09-04 ASSESSMENT — ACTIVITIES OF DAILY LIVING (ADL)
ADLS_ACUITY_SCORE: 35
ADLS_ACUITY_SCORE: 35
ADLS_ACUITY_SCORE: 33
ADLS_ACUITY_SCORE: 35

## 2022-09-04 ASSESSMENT — ENCOUNTER SYMPTOMS
EYE PAIN: 0
VOMITING: 0
WEAKNESS: 1
NERVOUS/ANXIOUS: 1
FATIGUE: 1
SLEEP DISTURBANCE: 1
HEADACHES: 1
NAUSEA: 1

## 2022-09-04 NOTE — ED PROVIDER NOTES
History   Chief Complaint:  Headache, generalized weakness, and fatigue       The history is provided by the patient and the spouse.      Brii Cook is a 72 year old female with history of benign neoplasm of sigmoid colon and osteopenia who presents with a constant headache in the frontal region of the head that has been lasting for 12 hours after she was seen here in the ED on 09/02. She was given a full workup and a CT where her results came back unremarkable. She describes her headache as a 7-8 out of 10. Alongside this, she reports nausea, fatigue, weakness, and lack of sleep. Angie also reports feeling anxious here in the ED. Patient notes that she has been on a 7 day course of Ciprofloxacin for a urinary issue. Today would be her last day. She notes history of experiencing occasional headaches, but she notes that this is her worst headache she has ever had. Denies vomiting and eye pain.    Review of Systems   Constitutional: Positive for fatigue.   Eyes: Negative for pain.   Gastrointestinal: Positive for nausea. Negative for vomiting.   Neurological: Positive for weakness and headaches (constant).   Psychiatric/Behavioral: Positive for sleep disturbance. The patient is nervous/anxious.    All other systems reviewed and are negative.        Allergies:  Sulfa Drugs  Penicillin  Ampicillin  Cefaclor  Codeine  Diatrizoate Meglumine    Medications:  Wellbutrin PO  Wellbutrin XL  Ciprofloxacin    Past Medical History:     Benign neoplasm of sigmoid colon  Osteopenia  Displaced fracture of shaft of right clavicle  Depression   Corneal erosion     Past Surgical History:    Lithotripsy  Appendectomy     Family History:    Brother(s): hypertension   Father: esophageal cancer  Sister(s): alcoholism, eating disorder    Social History:  The patient presents to the ED with her .  The patient presents to the ED via car.  The patient was recently seen in the ED 09/02.  PCP: Marge Rivas      Physical Exam     Patient Vitals for the past 24 hrs:   BP Temp Temp src Pulse Resp SpO2   09/04/22 0301 (!) 142/87 97.9  F (36.6  C) Temporal 91 16 97 %       Physical Exam      Constitutional:  Oriented to person, place, and time. Well appearing  HENT:   Ears:   TMs clear and no erythema  Head:    Normocephalic.   Mouth/Throat:   Oropharynx is clear and moist.   Eyes:    EOM are normal. Pupils are equal, round, and reactive to light.   Neck:    Neck supple.   Cardiovascular:  Normal rate, regular rhythm and normal heart sounds.      Exam reveals no gallop and no friction rub.       No murmur heard.  Pulmonary/Chest:  Effort normal and breath sounds normal.      No respiratory distress. No wheezes. No rales.      No reproducible chest wall pain.  Abdominal:   Soft. No distension. No tenderness. No rebound and no guarding.   Musculoskeletal:  Normal range of motion.   Neurological:   Alert and oriented to person, place, and time. Cranial nerves 2-12 grossly normal. No meningeal signs. No ataxia. 5/5 strength and sensation intact to light touch in all 4 extremities.   Skin:    No rash noted. No pallor.     Emergency Department Course     Imaging:  CT Head w/o Contrast   Final Result   IMPRESSION:   1.  No acute intracranial process.   2.  Age-related changes described above.   3.  Retrocerebellar arachnoid cyst described above.        Report per radiology    Laboratory:  Labs Ordered and Resulted from Time of ED Arrival to Time of ED Departure   BASIC METABOLIC PANEL - Abnormal       Result Value    Creatinine 0.68      Sodium 139      Potassium 3.6      Urea Nitrogen 11.7      Chloride 106      Carbon Dioxide (CO2) 23      Anion Gap 10      Glucose 115 (*)     GFR Estimate >90      Calcium 8.6 (*)    CBC WITH PLATELETS AND DIFFERENTIAL    WBC Count 5.6      RBC Count 4.39      Hemoglobin 13.6      Hematocrit 41.8      MCV 95      MCH 31.0      MCHC 32.5      RDW 12.6      Platelet Count 183      % Neutrophils 72       % Lymphocytes 17      % Monocytes 9      % Eosinophils 2      % Basophils 0      % Immature Granulocytes 0      NRBCs per 100 WBC 0      Absolute Neutrophils 4.1      Absolute Lymphocytes 0.9      Absolute Monocytes 0.5      Absolute Eosinophils 0.1      Absolute Basophils 0.0      Absolute Immature Granulocytes 0.0      Absolute NRBCs 0.0     ROUTINE UA WITH MICROSCOPIC REFLEX TO CULTURE   INFLUENZA A/B & SARS-COV2 PCR MULTIPLEX        Emergency Department Course:     Reviewed:  I reviewed nursing notes, vitals, past medical history and Care Everywhere    Assessments:  0314 I obtained history and examined the patient as noted above.    I rechecked the patient and explained findings.       Consults:  643 I spoke with Ema Gonzalez CNP from neurosurgery regarding the patient's presentation and plan of care.  717 I spoke with Ema Gonzalez CNP from neurosurgery regarding the patient's presentation and plan of care.    Interventions:  Medications   ketorolac (TORADOL) injection 15 mg (15 mg Intravenous Given 22)   lidocaine 4 % injection 0.5-1 mL (1 mL Intranasal Given 225)   prochlorperazine (COMPAZINE) injection 5 mg (5 mg Intravenous Given 223)   LORazepam (ATIVAN) injection 1 mg (1 mg Intravenous Given 222)   0.9% sodium chloride BOLUS (0 mLs Intravenous Stopped 2211)         Disposition:  Care of the patient was transferred to my colleague Dr. Royal pending Neuro Surgery consult.     Impression & Plan     Medical Decision Makin-year-old female previously seen for concerns of pyelonephritis is currently on day 6 of ciprofloxacin.  Previous ED encounter had full work-up including CT abdomen pelvis as well as UA that was reassuring and showed resolution of pyuria.  She is here complaining of generalized fatigue and anxiety and worsening headache.  Differential for headache includes pyelonephritis, adverse reaction to medication, migraine, intracranial  hemorrhage, mass, meningitis, or other causes.  I did end up obtaining CT of the head.  This showed finding of retrocerebellar arachnoid cyst with mild mass-effect.  I did note a previous CT of the head in 2012 that showed the cyst without findings of mass-effect.  I did discuss the case at length with NP with neurosurgery who discussed and reviewed CT imaging with neurosurgery attending.  This was felt to be benign and would not require inpatient admission or further imaging or surgery.  Plan is for neurosurgery consultation at the bedside to arrange for follow-up.  She is day 6 of her ciprofloxacin and I did discuss with resolution of symptoms and urine that she had this could be discontinued.  Upon reevaluation she has headache free after interventions.  I would doubt intracranial hemorrhage.  Also of note she has contrast allergy and CTA would be contraindicated.  I would doubt meningitis with no meningeal signs no fever.  Therefore not believe lumbar puncture is indicated.  Case has been discussed and signed out to my colleague Dr. Royal who will follow up on neurosurgery recommendations and if appropriate discharge.    Diagnosis:    ICD-10-CM    1. Arachnoid cyst  G93.0 CT Head w/o Contrast   2. Nonintractable headache, unspecified chronicity pattern, unspecified headache type  R51.9    3. Weakness generalized  R53.1        Discharge Medications:  New Prescriptions    No medications on file       Scribe Disclosure:  I, Fercho Cruz, am serving as a scribe at 3:09 AM on 9/4/2022 to document services personally performed by Pravin York MD based on my observations and the provider's statements to me.        Pravin York MD  09/04/22 9675

## 2022-09-04 NOTE — CONSULTS
[facility]    Neurosurgery Consultation     Date of Admission:  9/4/2022  Date of Consult (When I saw the patient): 09/04/22    Assessment & Plan   Brii Cook is a 72 year old female who presented on 9/4/2022 with worsening headache and nausea. Radiographic findings include retrocerebellar arachnoid cyst with mild mass effect on the cerebellar vermis, measures approximately 1.8 cm by 5.1 cm, likely chronic. Patient reports symptoms improved with migraine medication. She is awake, alert, following commands, moves all extremities equally. Denies any other neurological changes or concerns today.     Plan  - No surgical intervention recommended at this time   - Follow up in 6 weeks with head CT prior  - Okay to discharge from [facility] standpoint   - Call [facility] clinic with any questions, concerns, or new/worsening symptoms     I have discussed the following assessment and plan with Dr. Feng.     Ema Gonzalez CNP  [facility] Neurosurgery  [address] [address]  Tel [phone]  Pager [phone]    Code Status    No Order    Reason for Consult   Reason for consult: I was asked by Dr. York to evaluate this patient for retrocerebellar arachnoid cyst.    Primary Care Physician   Marge Rivas    Chief Complaint   Headache     History is obtained from the patient, electronic health record and emergency department physician    History of Present Illness   Brii Cook is a 72 year old female who presented on 9/4/2022 with worsening headache and nausea. Patient reports history of mild, frequent headaches but she was concerned about worsening headache over the past 12 hours. She denies any injuries, trauma, dizziness, vision/speech changes, confusion, or other neuro changes. Radiographic findings include retrocerebellar arachnoid cyst with mild mass effect on the cerebellar vermis, measures approximately 1.8 cm by  5.1 cm, likely chronic. Patient reports headache and nausea improved with migraine medication. She is awake, alert, following commands, moves all extremities equally.     EXAM: CT HEAD W/O CONTRAST  LOCATION: [facility]  DATE/TIME: 9/4/2022 5:58 AM                                                                 IMPRESSION:  1.  No acute intracranial process.  2.  Age-related changes described above.  3.  Retrocerebellar arachnoid cyst described above.    Past Medical History   I have reviewed this patient's medical history and updated it with pertinent information if needed.   No past medical history on file.    Past Surgical History   I have reviewed this patient's surgical history and updated it with pertinent information if needed.  No past surgical history on file.    Prior to Admission Medications   Prior to Admission Medications   Prescriptions Last Dose Informant Patient Reported? Taking?   BuPROPion HCl (WELLBUTRIN PO)   Yes No   Sig: Take 100 mg by mouth   TRAZODONE HCL PO   Yes No   Sig: Take 25 mg by mouth      Facility-Administered Medications: None     Allergies   Allergies   Allergen Reactions     Sulfa Drugs Unknown     Penicillin G Rash       Social History   I have reviewed this patient's social history and updated it with pertinent information if needed. Brii Cook      Family History   I have reviewed this patient's family history and updated it with pertinent information if needed.   No family history on file.    Review of Systems   10 point ROS negative other than symptoms noted in HPI.    Physical Exam   Temp: 97.9  F (36.6  C) Temp src: Temporal BP: (!) 142/87 Pulse: 91   Resp: 16 SpO2: 97 %      Vital Signs with Ranges  Temp:  [97.9  F (36.6  C)] 97.9  F (36.6  C)  Pulse:  [91] 91  Resp:  [16] 16  BP: (142)/(87) 142/87  SpO2:  [97 %] 97 %  0 lbs 0 oz     , Blood pressure (!) 142/87, pulse 91, temperature 97.9  F (36.6  C), temperature source Temporal, resp.  rate 16, SpO2 97 %.  0 lbs 0 oz  HEENT:  Normocephalic, atraumatic  Heart:  No peripheral edema  Lungs:  No SOB  Skin:  Warm and dry, good capillary refill.    NEUROLOGICAL EXAMINATION:   Mental status:  Alert and Oriented x 3, speech is fluent.  Cranial nerves:  II-XII intact.   Motor:  Strength is 5/5 throughout the upper and lower extremities  Shoulder Abduction:  Right:  5/5   Left:  5/5  Biceps:                      Right:  5/5   Left:  5/5  Triceps:                     Right:  5/5   Left:  5/5  Wrist Extensors:       Right:  5/5   Left:  5/5  Wrist Flexors:           Right:  5/5   Left:  5/5  Interosseus :             Right:  5/5   Left:  5/5  Hip Flexor:                Right: 5/5  Left:  5/5  Hip Adductor:            Right:  5/5  Left:  5/5  Hip Abductor:            Right:  5/5  Left:  5/5  Gastroc Soleus:        Right:  5/5  Left:  5/5  Tib/Ant:                     Right:  5/5  Left:  5/5  EHL:                         Right:  5/5  Left:  5/5  Sensation:  Intact  Coordination:  Smooth finger to nose testing.   Negative pronator drift.        Data     CBC RESULTS:   Recent Labs   Lab Test 09/04/22  0416   WBC 5.6   RBC 4.39   HGB 13.6   HCT 41.8   MCV 95   MCH 31.0   MCHC 32.5   RDW 12.6        Basic Metabolic Panel:  Lab Results   Component Value Date     09/04/2022     04/05/2009      Lab Results   Component Value Date    POTASSIUM 3.6 09/04/2022    POTASSIUM 4.1 04/05/2009     Lab Results   Component Value Date    CHLORIDE 106 09/04/2022    CHLORIDE 101 04/05/2009     Lab Results   Component Value Date    DENNY 8.6 09/04/2022    DENNY 10.0 04/05/2009     Lab Results   Component Value Date    CO2 23 09/04/2022    CO2 31 04/05/2009     Lab Results   Component Value Date    BUN 11.7 09/04/2022    BUN 13 04/05/2009     Lab Results   Component Value Date    CR 0.68 09/04/2022    CR 0.77 04/05/2009     Lab Results   Component Value Date     09/04/2022     04/05/2009     INR:  No  results found for: INR

## 2022-09-04 NOTE — ED PROVIDER NOTES
Patient signed out to me by Dr. York.  Please see initial provider for full details.  In brief, patient presents with multiple symptoms including headache.  She is on day 5 of 7 for UTI.  CT head obtained showed retrocerebellar arachnoid cyst with mild mass effect by radiology. NSGY aware.  FLACA to see in ER with recommendations, no plans for urgent surgery.    She is awaiting UA.  If continued pyuria, complete abx.  Can stop abx if urine normal.  Dr. York reviewed UA and patient is instructed to stop her antibiotics.     NSGY recommendations reviewed with patient.  She expresses understanding.    Patient otherwise safe to discharge home.      Irineo Royal MD  Emergency Medicine     Genny, Irineo Claudio MD  09/04/22 1457

## 2022-09-04 NOTE — DISCHARGE INSTRUCTIONS
Your UA is reassuring and appears to have resolved pyelonephritis.  Feel free to please stop ciprofloxacin.  Please follow-up with neurosurgery in regards to your arachnoid cyst in your brain.    Discharge Instructions  Headache    You were seen today for a headache. Headaches may be caused by many different things such as muscle tension, sinus inflammation, anxiety and stress, having too little sleep, too much alcohol, some medical conditions or injury. You may have a migraine, which is caused by changes in the blood vessels in your head.  At this time your provider does not find that your headache is a sign of anything dangerous or life-threatening.  However, sometimes the signs of serious illness do not show up right away.      Generally, every Emergency Department visit should have a follow-up clinic visit with either a primary or a specialty clinic/provider. Please follow-up as instructed by your emergency provider today.    Return to the Emergency Department if:  You get a new fever of 100.4 F or higher.  Your headache gets much worse.  You get a stiff neck with your headache.  You get a new headache that is significantly different or worse than headaches you have had before.  You are vomiting (throwing up) and cannot keep food or water down.  You have blurry or double vision or other problems with your eyes.  You have a new weakness on one side of your body.  You have difficulty with balance which is new.  You or your family thinks you are confused.  You have a seizure.    What can I do to help myself?  Pain medications - You may take a pain medication such as Tylenol  (acetaminophen), Advil , Motrin  (ibuprofen) or Aleve  (naproxen).  Take a pain reliever as soon as you notice symptoms.  Starting medications as soon as you start to have symptoms may lessen the amount of pain you have.  Relaxing in a quiet, dark room may help.  Get enough sleep and eat meals regularly.  You may need to watch for certain foods  or other things which may trigger your headaches.  Keeping a journal of your headaches and possible triggers may help you and your primary provider to identify things which you should avoid which may be causing your headaches.  If you were given a prescription for medicine here today, be sure to read all of the information (including the package insert) that comes with your prescription.  This will include important information about the medicine, its side effects, and any warnings that you need to know about.  The pharmacist who fills the prescription can provide more information and answer questions you may have about the medicine.  If you have questions or concerns that the pharmacist cannot address, please call or return to the Emergency Department.   Remember that you can always come back to the Emergency Department if you are not able to see your regular provider in the amount of time listed above, if you get any new symptoms, or if there is anything that worries you.

## 2022-09-06 ENCOUNTER — TELEPHONE (OUTPATIENT)
Dept: NEUROSURGERY | Facility: CLINIC | Age: 72
End: 2022-09-06

## 2022-09-06 NOTE — TELEPHONE ENCOUNTER
Called pt to schedule appt with Dr Feng & karel CANO prior to appt. Pt stated she can't be seen by our providers as we are not in her network.   Did not schedule. Any questions please call pt.  Thank you.

## 2022-09-06 NOTE — TELEPHONE ENCOUNTER
----- Message from Ema Gonzalez NP sent at 9/5/2022  3:48 PM CDT -----  Follow-up with Dr. Feng in 6 weeks with head CT prior

## 2022-09-07 LAB — BACTERIA BLD CULT: NO GROWTH

## 2022-10-23 ENCOUNTER — HEALTH MAINTENANCE LETTER (OUTPATIENT)
Age: 72
End: 2022-10-23

## 2023-05-06 ENCOUNTER — HOSPITAL ENCOUNTER (INPATIENT)
Facility: CLINIC | Age: 73
LOS: 2 days | Discharge: HOME | DRG: 280 | End: 2023-05-09
Attending: EMERGENCY MEDICINE | Admitting: INTERNAL MEDICINE
Payer: COMMERCIAL

## 2023-05-06 ENCOUNTER — APPOINTMENT (OUTPATIENT)
Dept: CT IMAGING | Facility: CLINIC | Age: 73
DRG: 280 | End: 2023-05-06
Attending: EMERGENCY MEDICINE
Payer: COMMERCIAL

## 2023-05-06 DIAGNOSIS — I21.3 ST ELEVATION MI (STEMI) (H): ICD-10-CM

## 2023-05-06 DIAGNOSIS — I25.42 SPONTANEOUS DISSECTION OF CORONARY ARTERY: ICD-10-CM

## 2023-05-06 DIAGNOSIS — I21.3 ACUTE ST ELEVATION MYOCARDIAL INFARCTION (STEMI), UNSPECIFIED ARTERY (H): Primary | ICD-10-CM

## 2023-05-06 DIAGNOSIS — I47.29 PAROXYSMAL VENTRICULAR TACHYCARDIA (H): ICD-10-CM

## 2023-05-06 DIAGNOSIS — I21.4 NSTEMI (NON-ST ELEVATED MYOCARDIAL INFARCTION) (H): ICD-10-CM

## 2023-05-06 PROCEDURE — 99291 CRITICAL CARE FIRST HOUR: CPT | Mod: 25

## 2023-05-06 PROCEDURE — 250N000009 HC RX 250: Performed by: EMERGENCY MEDICINE

## 2023-05-06 PROCEDURE — 84484 ASSAY OF TROPONIN QUANT: CPT | Performed by: EMERGENCY MEDICINE

## 2023-05-06 PROCEDURE — 93005 ELECTROCARDIOGRAM TRACING: CPT

## 2023-05-06 PROCEDURE — 74177 CT ABD & PELVIS W/CONTRAST: CPT

## 2023-05-06 PROCEDURE — 85025 COMPLETE CBC W/AUTO DIFF WBC: CPT | Performed by: EMERGENCY MEDICINE

## 2023-05-06 PROCEDURE — 250N000011 HC RX IP 250 OP 636: Performed by: EMERGENCY MEDICINE

## 2023-05-06 PROCEDURE — 80053 COMPREHEN METABOLIC PANEL: CPT | Performed by: EMERGENCY MEDICINE

## 2023-05-06 PROCEDURE — 36415 COLL VENOUS BLD VENIPUNCTURE: CPT | Performed by: EMERGENCY MEDICINE

## 2023-05-06 PROCEDURE — 85027 COMPLETE CBC AUTOMATED: CPT | Performed by: INTERNAL MEDICINE

## 2023-05-06 RX ORDER — IOPAMIDOL 755 MG/ML
74 INJECTION, SOLUTION INTRAVASCULAR ONCE
Status: COMPLETED | OUTPATIENT
Start: 2023-05-06 | End: 2023-05-06

## 2023-05-06 RX ADMIN — IOPAMIDOL 74 ML: 755 INJECTION, SOLUTION INTRAVENOUS at 23:55

## 2023-05-06 RX ADMIN — SODIUM CHLORIDE 85 ML: 9 INJECTION, SOLUTION INTRAVENOUS at 23:56

## 2023-05-07 ENCOUNTER — APPOINTMENT (OUTPATIENT)
Dept: ULTRASOUND IMAGING | Facility: CLINIC | Age: 73
DRG: 280 | End: 2023-05-07
Attending: EMERGENCY MEDICINE
Payer: COMMERCIAL

## 2023-05-07 PROBLEM — I21.4 NSTEMI (NON-ST ELEVATED MYOCARDIAL INFARCTION) (H): Status: ACTIVE | Noted: 2023-05-07

## 2023-05-07 PROBLEM — I21.3 ACUTE ST ELEVATION MYOCARDIAL INFARCTION (STEMI) (H): Status: ACTIVE | Noted: 2023-05-07

## 2023-05-07 LAB
ALBUMIN SERPL BCG-MCNC: 3.5 G/DL (ref 3.5–5.2)
ALP SERPL-CCNC: 81 U/L (ref 35–104)
ALT SERPL W P-5'-P-CCNC: 16 U/L (ref 10–35)
ANION GAP SERPL CALCULATED.3IONS-SCNC: 11 MMOL/L (ref 7–15)
APTT PPP: 160 SECONDS (ref 22–38)
AST SERPL W P-5'-P-CCNC: 23 U/L (ref 10–35)
ATRIAL RATE - MUSE: 70 BPM
BASOPHILS # BLD AUTO: 0 10E3/UL (ref 0–0.2)
BASOPHILS NFR BLD AUTO: 0 %
BILIRUB SERPL-MCNC: <0.2 MG/DL
BUN SERPL-MCNC: 16.1 MG/DL (ref 8–23)
CALCIUM SERPL-MCNC: 7.9 MG/DL (ref 8.8–10.2)
CHLORIDE SERPL-SCNC: 105 MMOL/L (ref 98–107)
CHOLEST SERPL-MCNC: 169 MG/DL
CREAT SERPL-MCNC: 0.44 MG/DL (ref 0.51–0.95)
DEPRECATED HCO3 PLAS-SCNC: 24 MMOL/L (ref 22–29)
DIASTOLIC BLOOD PRESSURE - MUSE: NORMAL MMHG
EOSINOPHIL # BLD AUTO: 0.2 10E3/UL (ref 0–0.7)
EOSINOPHIL NFR BLD AUTO: 4 %
ERYTHROCYTE [DISTWIDTH] IN BLOOD BY AUTOMATED COUNT: 12.9 % (ref 10–15)
ERYTHROCYTE [DISTWIDTH] IN BLOOD BY AUTOMATED COUNT: 13 % (ref 10–15)
GFR SERPL CREATININE-BSD FRML MDRD: >90 ML/MIN/1.73M2
GLUCOSE SERPL-MCNC: 128 MG/DL (ref 70–99)
HCT VFR BLD AUTO: 36 % (ref 35–47)
HCT VFR BLD AUTO: 38.9 % (ref 35–47)
HDLC SERPL-MCNC: 73 MG/DL
HGB BLD-MCNC: 12 G/DL (ref 11.7–15.7)
HGB BLD-MCNC: 12.7 G/DL (ref 11.7–15.7)
HOLD SPECIMEN: NORMAL
HOLD SPECIMEN: NORMAL
IMM GRANULOCYTES # BLD: 0 10E3/UL
IMM GRANULOCYTES NFR BLD: 0 %
INTERPRETATION ECG - MUSE: NORMAL
LDLC SERPL CALC-MCNC: 91 MG/DL
LYMPHOCYTES # BLD AUTO: 2.1 10E3/UL (ref 0.8–5.3)
LYMPHOCYTES NFR BLD AUTO: 38 %
MAGNESIUM SERPL-MCNC: 2 MG/DL (ref 1.7–2.3)
MCH RBC QN AUTO: 31.4 PG (ref 26.5–33)
MCH RBC QN AUTO: 32.1 PG (ref 26.5–33)
MCHC RBC AUTO-ENTMCNC: 32.6 G/DL (ref 31.5–36.5)
MCHC RBC AUTO-ENTMCNC: 33.3 G/DL (ref 31.5–36.5)
MCV RBC AUTO: 96 FL (ref 78–100)
MCV RBC AUTO: 96 FL (ref 78–100)
MONOCYTES # BLD AUTO: 0.7 10E3/UL (ref 0–1.3)
MONOCYTES NFR BLD AUTO: 13 %
NEUTROPHILS # BLD AUTO: 2.4 10E3/UL (ref 1.6–8.3)
NEUTROPHILS NFR BLD AUTO: 45 %
NONHDLC SERPL-MCNC: 96 MG/DL
NRBC # BLD AUTO: 0 10E3/UL
NRBC BLD AUTO-RTO: 0 /100
NT-PROBNP SERPL-MCNC: 345 PG/ML (ref 0–900)
P AXIS - MUSE: 11 DEGREES
PLATELET # BLD AUTO: 201 10E3/UL (ref 150–450)
PLATELET # BLD AUTO: 210 10E3/UL (ref 150–450)
POTASSIUM SERPL-SCNC: 3 MMOL/L (ref 3.4–5.3)
POTASSIUM SERPL-SCNC: 3.8 MMOL/L (ref 3.4–5.3)
PR INTERVAL - MUSE: 144 MS
PROT SERPL-MCNC: 5.7 G/DL (ref 6.4–8.3)
QRS DURATION - MUSE: 80 MS
QT - MUSE: 406 MS
QTC - MUSE: 438 MS
R AXIS - MUSE: 29 DEGREES
RBC # BLD AUTO: 3.74 10E6/UL (ref 3.8–5.2)
RBC # BLD AUTO: 4.04 10E6/UL (ref 3.8–5.2)
SODIUM SERPL-SCNC: 140 MMOL/L (ref 136–145)
SYSTOLIC BLOOD PRESSURE - MUSE: NORMAL MMHG
T AXIS - MUSE: 49 DEGREES
TRIGL SERPL-MCNC: 26 MG/DL
TROPONIN T SERPL HS-MCNC: 16 NG/L
TROPONIN T SERPL HS-MCNC: 1999 NG/L
TROPONIN T SERPL HS-MCNC: 233 NG/L
TROPONIN T SERPL HS-MCNC: 965 NG/L
VENTRICULAR RATE- MUSE: 70 BPM
WBC # BLD AUTO: 5.3 10E3/UL (ref 4–11)
WBC # BLD AUTO: 5.4 10E3/UL (ref 4–11)

## 2023-05-07 PROCEDURE — 250N000009 HC RX 250: Performed by: INTERNAL MEDICINE

## 2023-05-07 PROCEDURE — 36415 COLL VENOUS BLD VENIPUNCTURE: CPT | Performed by: INTERNAL MEDICINE

## 2023-05-07 PROCEDURE — 36415 COLL VENOUS BLD VENIPUNCTURE: CPT | Performed by: HOSPITALIST

## 2023-05-07 PROCEDURE — B2111ZZ FLUOROSCOPY OF MULTIPLE CORONARY ARTERIES USING LOW OSMOLAR CONTRAST: ICD-10-PCS | Performed by: INTERNAL MEDICINE

## 2023-05-07 PROCEDURE — 4A023N7 MEASUREMENT OF CARDIAC SAMPLING AND PRESSURE, LEFT HEART, PERCUTANEOUS APPROACH: ICD-10-PCS | Performed by: INTERNAL MEDICINE

## 2023-05-07 PROCEDURE — 76705 ECHO EXAM OF ABDOMEN: CPT

## 2023-05-07 PROCEDURE — 83735 ASSAY OF MAGNESIUM: CPT | Performed by: INTERNAL MEDICINE

## 2023-05-07 PROCEDURE — 36415 COLL VENOUS BLD VENIPUNCTURE: CPT | Performed by: EMERGENCY MEDICINE

## 2023-05-07 PROCEDURE — 250N000013 HC RX MED GY IP 250 OP 250 PS 637: Performed by: INTERNAL MEDICINE

## 2023-05-07 PROCEDURE — 84132 ASSAY OF SERUM POTASSIUM: CPT | Performed by: HOSPITALIST

## 2023-05-07 PROCEDURE — 272N000001 HC OR GENERAL SUPPLY STERILE: Performed by: INTERNAL MEDICINE

## 2023-05-07 PROCEDURE — 80061 LIPID PANEL: CPT | Performed by: INTERNAL MEDICINE

## 2023-05-07 PROCEDURE — C1894 INTRO/SHEATH, NON-LASER: HCPCS | Performed by: INTERNAL MEDICINE

## 2023-05-07 PROCEDURE — 93005 ELECTROCARDIOGRAM TRACING: CPT | Mod: 76

## 2023-05-07 PROCEDURE — 99152 MOD SED SAME PHYS/QHP 5/>YRS: CPT | Performed by: INTERNAL MEDICINE

## 2023-05-07 PROCEDURE — 99207 PR APP CREDIT; MD BILLING SHARED VISIT: CPT | Performed by: HOSPITALIST

## 2023-05-07 PROCEDURE — 85730 THROMBOPLASTIN TIME PARTIAL: CPT | Performed by: EMERGENCY MEDICINE

## 2023-05-07 PROCEDURE — 93458 L HRT ARTERY/VENTRICLE ANGIO: CPT | Mod: 26 | Performed by: INTERNAL MEDICINE

## 2023-05-07 PROCEDURE — 250N000011 HC RX IP 250 OP 636: Performed by: INTERNAL MEDICINE

## 2023-05-07 PROCEDURE — 250N000013 HC RX MED GY IP 250 OP 250 PS 637: Performed by: EMERGENCY MEDICINE

## 2023-05-07 PROCEDURE — 250N000013 HC RX MED GY IP 250 OP 250 PS 637: Performed by: HOSPITALIST

## 2023-05-07 PROCEDURE — 83880 ASSAY OF NATRIURETIC PEPTIDE: CPT | Performed by: INTERNAL MEDICINE

## 2023-05-07 PROCEDURE — C1769 GUIDE WIRE: HCPCS | Performed by: INTERNAL MEDICINE

## 2023-05-07 PROCEDURE — 99223 1ST HOSP IP/OBS HIGH 75: CPT | Mod: AI | Performed by: INTERNAL MEDICINE

## 2023-05-07 PROCEDURE — 250N000011 HC RX IP 250 OP 636: Performed by: EMERGENCY MEDICINE

## 2023-05-07 PROCEDURE — 93458 L HRT ARTERY/VENTRICLE ANGIO: CPT | Performed by: INTERNAL MEDICINE

## 2023-05-07 PROCEDURE — 84484 ASSAY OF TROPONIN QUANT: CPT | Performed by: INTERNAL MEDICINE

## 2023-05-07 PROCEDURE — 99291 CRITICAL CARE FIRST HOUR: CPT | Mod: 25 | Performed by: INTERNAL MEDICINE

## 2023-05-07 PROCEDURE — 210N000002 HC R&B HEART CARE

## 2023-05-07 PROCEDURE — C1725 CATH, TRANSLUMIN NON-LASER: HCPCS | Performed by: INTERNAL MEDICINE

## 2023-05-07 PROCEDURE — 84484 ASSAY OF TROPONIN QUANT: CPT | Performed by: EMERGENCY MEDICINE

## 2023-05-07 PROCEDURE — 96374 THER/PROPH/DIAG INJ IV PUSH: CPT | Mod: 59

## 2023-05-07 RX ORDER — HEPARIN SODIUM 10000 [USP'U]/100ML
0-5000 INJECTION, SOLUTION INTRAVENOUS CONTINUOUS
Status: DISPENSED | OUTPATIENT
Start: 2023-05-07 | End: 2023-05-09

## 2023-05-07 RX ORDER — LIDOCAINE 40 MG/G
CREAM TOPICAL
Status: DISCONTINUED | OUTPATIENT
Start: 2023-05-07 | End: 2023-05-09 | Stop reason: HOSPADM

## 2023-05-07 RX ORDER — NITROGLYCERIN 0.4 MG/1
0.4 TABLET SUBLINGUAL EVERY 5 MIN PRN
Status: DISCONTINUED | OUTPATIENT
Start: 2023-05-07 | End: 2023-05-07

## 2023-05-07 RX ORDER — ASPIRIN 81 MG/1
81 TABLET ORAL DAILY
Status: DISCONTINUED | OUTPATIENT
Start: 2023-05-08 | End: 2023-05-09 | Stop reason: HOSPADM

## 2023-05-07 RX ORDER — POTASSIUM CHLORIDE 29.8 MG/ML
20 INJECTION INTRAVENOUS ONCE
Status: COMPLETED | OUTPATIENT
Start: 2023-05-07 | End: 2023-05-07

## 2023-05-07 RX ORDER — ACETAMINOPHEN 650 MG/1
650 SUPPOSITORY RECTAL EVERY 4 HOURS PRN
Status: DISCONTINUED | OUTPATIENT
Start: 2023-05-07 | End: 2023-05-09 | Stop reason: HOSPADM

## 2023-05-07 RX ORDER — BUPROPION HYDROCHLORIDE 150 MG/1
150 TABLET ORAL EVERY MORNING
Status: DISCONTINUED | OUTPATIENT
Start: 2023-05-07 | End: 2023-05-09 | Stop reason: HOSPADM

## 2023-05-07 RX ORDER — ACETAMINOPHEN 325 MG/1
650 TABLET ORAL EVERY 4 HOURS PRN
Status: DISCONTINUED | OUTPATIENT
Start: 2023-05-07 | End: 2023-05-07

## 2023-05-07 RX ORDER — NALOXONE HYDROCHLORIDE 0.4 MG/ML
0.4 INJECTION, SOLUTION INTRAMUSCULAR; INTRAVENOUS; SUBCUTANEOUS
Status: ACTIVE | OUTPATIENT
Start: 2023-05-07 | End: 2023-05-07

## 2023-05-07 RX ORDER — LIDOCAINE 40 MG/G
CREAM TOPICAL
Status: DISCONTINUED | OUTPATIENT
Start: 2023-05-07 | End: 2023-05-07 | Stop reason: HOSPADM

## 2023-05-07 RX ORDER — ONDANSETRON 2 MG/ML
4 INJECTION INTRAMUSCULAR; INTRAVENOUS EVERY 6 HOURS PRN
Status: DISCONTINUED | OUTPATIENT
Start: 2023-05-07 | End: 2023-05-09 | Stop reason: HOSPADM

## 2023-05-07 RX ORDER — HEPARIN SODIUM 10000 [USP'U]/100ML
0-5000 INJECTION, SOLUTION INTRAVENOUS CONTINUOUS
Status: DISCONTINUED | OUTPATIENT
Start: 2023-05-07 | End: 2023-05-07

## 2023-05-07 RX ORDER — NITROGLYCERIN 0.1MG/HR
1 PATCH, TRANSDERMAL 24 HOURS TRANSDERMAL DAILY
Status: DISCONTINUED | OUTPATIENT
Start: 2023-05-07 | End: 2023-05-08

## 2023-05-07 RX ORDER — LORAZEPAM 2 MG/ML
0.5 INJECTION INTRAMUSCULAR
Status: DISCONTINUED | OUTPATIENT
Start: 2023-05-07 | End: 2023-05-07 | Stop reason: HOSPADM

## 2023-05-07 RX ORDER — ONDANSETRON 4 MG/1
4 TABLET, ORALLY DISINTEGRATING ORAL EVERY 6 HOURS PRN
Status: DISCONTINUED | OUTPATIENT
Start: 2023-05-07 | End: 2023-05-09 | Stop reason: HOSPADM

## 2023-05-07 RX ORDER — NALOXONE HYDROCHLORIDE 0.4 MG/ML
0.2 INJECTION, SOLUTION INTRAMUSCULAR; INTRAVENOUS; SUBCUTANEOUS
Status: ACTIVE | OUTPATIENT
Start: 2023-05-07 | End: 2023-05-07

## 2023-05-07 RX ORDER — FENTANYL CITRATE 50 UG/ML
INJECTION, SOLUTION INTRAMUSCULAR; INTRAVENOUS
Status: DISCONTINUED | OUTPATIENT
Start: 2023-05-07 | End: 2023-05-07 | Stop reason: HOSPADM

## 2023-05-07 RX ORDER — ATORVASTATIN CALCIUM 20 MG/1
20 TABLET, FILM COATED ORAL EVERY EVENING
Status: DISCONTINUED | OUTPATIENT
Start: 2023-05-07 | End: 2023-05-08

## 2023-05-07 RX ORDER — LORAZEPAM 0.5 MG/1
0.5 TABLET ORAL
Status: DISCONTINUED | OUTPATIENT
Start: 2023-05-07 | End: 2023-05-07 | Stop reason: HOSPADM

## 2023-05-07 RX ORDER — METOPROLOL TARTRATE 25 MG/1
25 TABLET, FILM COATED ORAL 2 TIMES DAILY
Status: DISCONTINUED | OUTPATIENT
Start: 2023-05-07 | End: 2023-05-08

## 2023-05-07 RX ORDER — IOPAMIDOL 755 MG/ML
INJECTION, SOLUTION INTRAVASCULAR
Status: DISCONTINUED | OUTPATIENT
Start: 2023-05-07 | End: 2023-05-07 | Stop reason: HOSPADM

## 2023-05-07 RX ORDER — ASPIRIN 81 MG/1
324 TABLET, CHEWABLE ORAL ONCE
Status: COMPLETED | OUTPATIENT
Start: 2023-05-07 | End: 2023-05-07

## 2023-05-07 RX ORDER — POTASSIUM CHLORIDE 1500 MG/1
20 TABLET, EXTENDED RELEASE ORAL
Status: DISCONTINUED | OUTPATIENT
Start: 2023-05-07 | End: 2023-05-07 | Stop reason: HOSPADM

## 2023-05-07 RX ORDER — NITROGLYCERIN 0.4 MG/1
0.4 TABLET SUBLINGUAL EVERY 5 MIN PRN
Status: DISCONTINUED | OUTPATIENT
Start: 2023-05-07 | End: 2023-05-09 | Stop reason: HOSPADM

## 2023-05-07 RX ORDER — SODIUM CHLORIDE 9 MG/ML
INJECTION, SOLUTION INTRAVENOUS CONTINUOUS
Status: DISCONTINUED | OUTPATIENT
Start: 2023-05-07 | End: 2023-05-07 | Stop reason: HOSPADM

## 2023-05-07 RX ORDER — FENTANYL CITRATE 50 UG/ML
25 INJECTION, SOLUTION INTRAMUSCULAR; INTRAVENOUS
Status: DISCONTINUED | OUTPATIENT
Start: 2023-05-07 | End: 2023-05-09 | Stop reason: HOSPADM

## 2023-05-07 RX ORDER — NITROGLYCERIN 5 MG/ML
VIAL (ML) INTRAVENOUS
Status: DISCONTINUED | OUTPATIENT
Start: 2023-05-07 | End: 2023-05-07 | Stop reason: HOSPADM

## 2023-05-07 RX ORDER — MAGNESIUM HYDROXIDE/ALUMINUM HYDROXICE/SIMETHICONE 120; 1200; 1200 MG/30ML; MG/30ML; MG/30ML
30 SUSPENSION ORAL EVERY 4 HOURS PRN
Status: DISCONTINUED | OUTPATIENT
Start: 2023-05-07 | End: 2023-05-09 | Stop reason: HOSPADM

## 2023-05-07 RX ORDER — FLUMAZENIL 0.1 MG/ML
0.2 INJECTION, SOLUTION INTRAVENOUS
Status: ACTIVE | OUTPATIENT
Start: 2023-05-07 | End: 2023-05-07

## 2023-05-07 RX ORDER — ATROPINE SULFATE 0.1 MG/ML
0.5 INJECTION INTRAVENOUS
Status: ACTIVE | OUTPATIENT
Start: 2023-05-07 | End: 2023-05-07

## 2023-05-07 RX ORDER — POTASSIUM CHLORIDE 1500 MG/1
40 TABLET, EXTENDED RELEASE ORAL ONCE
Status: COMPLETED | OUTPATIENT
Start: 2023-05-07 | End: 2023-05-07

## 2023-05-07 RX ORDER — ASPIRIN 81 MG/1
324 TABLET, CHEWABLE ORAL ONCE
Status: DISCONTINUED | OUTPATIENT
Start: 2023-05-07 | End: 2023-05-07

## 2023-05-07 RX ORDER — VERAPAMIL HYDROCHLORIDE 2.5 MG/ML
INJECTION, SOLUTION INTRAVENOUS
Status: DISCONTINUED | OUTPATIENT
Start: 2023-05-07 | End: 2023-05-07 | Stop reason: HOSPADM

## 2023-05-07 RX ORDER — ACETAMINOPHEN 325 MG/1
650 TABLET ORAL EVERY 4 HOURS PRN
Status: DISCONTINUED | OUTPATIENT
Start: 2023-05-07 | End: 2023-05-09 | Stop reason: HOSPADM

## 2023-05-07 RX ORDER — BUPROPION HYDROCHLORIDE 150 MG/1
150 TABLET ORAL DAILY
COMMUNITY

## 2023-05-07 RX ADMIN — BUPROPION HYDROCHLORIDE 150 MG: 150 TABLET, FILM COATED, EXTENDED RELEASE ORAL at 18:21

## 2023-05-07 RX ADMIN — HEPARIN SODIUM AND DEXTROSE 800 UNITS/HR: 10000; 5 INJECTION INTRAVENOUS at 03:24

## 2023-05-07 RX ADMIN — ATORVASTATIN CALCIUM 20 MG: 20 TABLET, FILM COATED ORAL at 19:51

## 2023-05-07 RX ADMIN — HEPARIN SODIUM AND DEXTROSE 800 UNITS/HR: 10000; 5 INJECTION INTRAVENOUS at 18:14

## 2023-05-07 RX ADMIN — METOPROLOL TARTRATE 25 MG: 25 TABLET, FILM COATED ORAL at 07:59

## 2023-05-07 RX ADMIN — POTASSIUM CHLORIDE 40 MEQ: 1500 TABLET, EXTENDED RELEASE ORAL at 02:11

## 2023-05-07 RX ADMIN — METOPROLOL TARTRATE 25 MG: 25 TABLET, FILM COATED ORAL at 19:51

## 2023-05-07 RX ADMIN — ONDANSETRON 4 MG: 2 INJECTION INTRAMUSCULAR; INTRAVENOUS at 12:23

## 2023-05-07 RX ADMIN — NITROGLYCERIN 1 PATCH: 0.1 PATCH TRANSDERMAL at 07:59

## 2023-05-07 RX ADMIN — ACETAMINOPHEN 650 MG: 325 TABLET ORAL at 03:27

## 2023-05-07 RX ADMIN — ASPIRIN 81 MG CHEWABLE TABLET 324 MG: 81 TABLET CHEWABLE at 02:09

## 2023-05-07 ASSESSMENT — ACTIVITIES OF DAILY LIVING (ADL)
ADLS_ACUITY_SCORE: 35

## 2023-05-07 NOTE — PHARMACY-ADMISSION MEDICATION HISTORY
Pharmacist Admission Medication History    Admission medication history is complete. The information provided in this note is only as accurate as the sources available at the time of the update.    Medication reconciliation/reorder completed by provider prior to medication history? Yes    Information Source(s): Patient, Family member and CareEverywhere/SureScripts via in-person    Pertinent Information:   - stopped taking supplements to avoid potential interactions with Xarelto.     Changes made to PTA medication list:    Added: bupropion    Deleted: None    Changed: None    Medication Affordability:  Not including over the counter (OTC) medications, was there a time in the past 12 months when you did not take your medications as prescribed because of cost?: No    Allergies reviewed with patient and updates made in EHR: yes    Medication History Completed By: Wicho Pace [facility] 5/7/2023 9:44 AM    Prior to Admission medications    Medication Sig Last Dose Taking? Auth Provider Long Term End Date   buPROPion (WELLBUTRIN XL) 150 MG 24 hr tablet Take 150 mg by mouth every morning 5/6/2023 at morning Yes Unknown, Entered By History No    rivaroxaban ANTICOAGULANT (XARELTO) 20 MG TABS tablet Take 20 mg by mouth daily (with dinner) 5/6/2023 at evening Yes Unknown, Entered By History Yes

## 2023-05-07 NOTE — PRE-PROCEDURE
GENERAL PRE-PROCEDURE:   Procedure:  Coronary angioram possible PCI  Date/Time:  5/7/2023 10:50 AM    Verbal consent obtained?: Yes    Emergent situation    Risks and benefits: Risks, benefits and alternatives were discussed    Consent given by:  Patient  Patient states understanding of procedure being performed: Yes    Patient's understanding of procedure matches consent: Yes    Procedure consent matches procedure scheduled: Yes    Expected level of sedation:  Moderate  Appropriately NPO:  Yes  Mallampati  :  Grade 1- soft palate, uvula, tonsillar pillars, and posterior pharyngeal wall visible  Lungs:  Lungs clear with good breath sounds bilaterally  Heart:  Normal heart sounds and rate  History & Physical reviewed:  History and physical reviewed and no updates needed  Statement of review:  I have reviewed the lab findings, diagnostic data, medications, and the plan for sedation

## 2023-05-07 NOTE — ED NOTES
Bed: ST01  Expected date: 5/6/23  Expected time:   Means of arrival:   Comments:  Quentin 71yo F.  STEMI alert.  Elevation in V5, V6

## 2023-05-07 NOTE — H&P
[facility]    History and Physical - Hospitalist Service       Date of Admission:  5/6/2023    Assessment & Plan      Brii Cook is a 72 year old female admitted on 5/6/2023. She has no known prior CAD or other traditional documented cardiac risk factors.  She was dx on 4/6 with PE with negative hypercoag workup and was treated on xarelto with plans to discontinue after international trip.  She presents with STEMI that responded to NTG with resolution but with significant troponin elevation.    1. STEMI  -- was given NTG and has been pain free  -- troponin elevated  -- ECG showed initial ST elevation V4-V6, subsequently improved with NTG  -- started on heparin  -- start metoprolol 25mg bid  -- start lipitor 20 mg daily  -- add NTG patch as she still has some residual chest discomfort 1-2 /10  -- check FLP  -- TTE  -- cardiology consultation    2. Recent small RLL unprovoked PE 4/6/23  -- repeat CT shows small PE has resolved  -- hold xarelto with initiation of iv heparin  -- follow PTT  -- if she has negative cardiac work up, may need hematology for her significant hypercoagulable events within the last month.      3. Hypokalemia  -- replace K  -- check Mg    4. Depression / Anxiety  -- on welbutrin and trazodone can resume once verified     Diet: NPO  DVT Prophylaxis: Heparin    Horton Catheter: Not present  Lines: None     Cardiac Monitoring: ACTIVE order. Indication: AMI (NSTEMI/ STEMI) (48 hours)  Code Status: Full Code      Clinically Significant Risk Factors Present on Admission        # Hypokalemia: Lowest K = 3 mmol/L in last 2 days, will replace as needed   # Hypocalcemia: Lowest Ca = 7.9 mg/dL in last 2 days, will monitor and replace as appropriate                      Disposition Plan    -- anticipate coronary angiogram tomorrow or earlier today as per cardiology  -- anticipate home on 5/9    Elvis Craft MD  Hospitalist Service  [facility]  Hospital  Securely message with [employer] (more info)  Text page via [facility] Paging/Directory     ______________________________________________________________________    Chief Complaint   Bilateral shoulder pain    History is obtained from the patient    History of Present Illness   Brii Cook is a 72 year old female who has hx of anxiety, depression, kidney stones, recent small RLL PE on 4/6 and on xarelto but no prior hx of CAD or other significant cardiac risk factors comes in via EMS with bilateral axillary pain initially that became bilateral shoulder pain.  This pain came on suddenly and rated significant 7-8/10.  EMS was activated and the patient took asa prior to EMS arrival.  En route she received 3 NTG which improved her pain symptoms but did not completely resolve her bilateral shoulder discomfort.      In the ER she was seen emergently, no fever, stable vital signs.  Her K is 3, troponin 16 > 233 >> 965, normal cbc.  ECG showed 1mm ST elevation V5, V6 that seemed to resolve.  CT of chest showed resolution of RLL small PE.  She is currently chest pain free and has been started on IV heparin.      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     Generalized abdominal pain 03/29/2022     Major depressive disorder, recurrent, mild 03/29/2022          Past Surgical History   LITHOTRIPSY          APPENDECTOMY        Family History  History  Medical History Relation Name Comments   Hypertension Brother        Esophageal cancer Father        Cancer-colon Maternal Grandfather    age 80s   Dementia Mother        Cancer-breast Paternal Grandmother    age 50s   Alcoholism Sister        Eating disorder Sister          Family History  Relation Name Status Comments   Brother    Alive     Father          Maternal Grandfather          Mother         Paternal Grandmother          Sister    Alive      Social History  History  Tobacco Use Types Packs/Day Years Used Date   Smoking Tobacco: Never           Smokeless Tobacco: Never             Social History  Alcohol Use Standard Drinks/Week Comments   Yes 0 (1 standard drink = 0.6 oz pure alcohol) very rare - maybe one glass a wine in a year          Prior to Admission Medications   Prior to Admission Medications   Prescriptions Last Dose Informant Patient Reported? Taking?   BuPROPion HCl (WELLBUTRIN PO)   Yes No   Sig: Take 100 mg by mouth   TRAZODONE HCL PO   Yes No   Sig: Take 25 mg by mouth      Facility-Administered Medications: None        Review of Systems    The 10 point Review of Systems is negative other than noted in the HPI      Physical Exam   Vital Signs: Temp: 98.1  F (36.7  C) Temp src: Oral BP: (!) 145/78 Pulse: 78   Resp: 20 SpO2: 93 % O2 Device: None (Room air)    Weight: 148 lbs 5.91 oz    General Appearance: NAD, slightly anxious  Respiratory: CTA  Cardiovascular: RRR  GI: soft +BS  Skin: warm, dry, well perfused  Other: non focal neurological    Medical Decision Making     80 MINUTES SPENT BY ME on the date of service doing chart review, history, exam, documentation & further activities per the note.      Data   NOTE: Data reviewed over the past 24 hrs contributes toward MDM complexity   Results for orders placed or performed during the hospital encounter of 23 (from the past 24 hour(s))   CBC with platelets differential    Narrative    The following orders were created for panel order CBC with platelets differential.  Procedure                               Abnormality         Status                     ---------                               -----------         ------                     CBC with platelets and d...[[id number]]                      Final result                 Please view results for these tests on the  individual orders.   Comprehensive metabolic panel   Result Value Ref Range    Sodium 140 136 - 145 mmol/L    Potassium 3.0 (L) 3.4 - 5.3 mmol/L    Chloride 105 98 - 107 mmol/L    Carbon Dioxide (CO2) 24 22 - 29 mmol/L    Anion Gap 11 7 - 15 mmol/L    Urea Nitrogen 16.1 8.0 - 23.0 mg/dL    Creatinine 0.44 (L) 0.51 - 0.95 mg/dL    Calcium 7.9 (L) 8.8 - 10.2 mg/dL    Glucose 128 (H) 70 - 99 mg/dL    Alkaline Phosphatase 81 35 - 104 U/L    AST 23 10 - 35 U/L    ALT 16 10 - 35 U/L    Protein Total 5.7 (L) 6.4 - 8.3 g/dL    Albumin 3.5 3.5 - 5.2 g/dL    Bilirubin Total <0.2 <=1.2 mg/dL    GFR Estimate >90 >60 mL/min/1.73m2   Troponin T, High Sensitivity   Result Value Ref Range    Troponin T, High Sensitivity 16 (H) <=14 ng/L   CBC with platelets and differential   Result Value Ref Range    WBC Count 5.4 4.0 - 11.0 10e3/uL    RBC Count 4.04 3.80 - 5.20 10e6/uL    Hemoglobin 12.7 11.7 - 15.7 g/dL    Hematocrit 38.9 35.0 - 47.0 %    MCV 96 78 - 100 fL    MCH 31.4 26.5 - 33.0 pg    MCHC 32.6 31.5 - 36.5 g/dL    RDW 12.9 10.0 - 15.0 %    Platelet Count 210 150 - 450 10e3/uL    % Neutrophils 45 %    % Lymphocytes 38 %    % Monocytes 13 %    % Eosinophils 4 %    % Basophils 0 %    % Immature Granulocytes 0 %    NRBCs per 100 WBC 0 <1 /100    Absolute Neutrophils 2.4 1.6 - 8.3 10e3/uL    Absolute Lymphocytes 2.1 0.8 - 5.3 10e3/uL    Absolute Monocytes 0.7 0.0 - 1.3 10e3/uL    Absolute Eosinophils 0.2 0.0 - 0.7 10e3/uL    Absolute Basophils 0.0 0.0 - 0.2 10e3/uL    Absolute Immature Granulocytes 0.0 <=0.4 10e3/uL    Absolute NRBCs 0.0 10e3/uL   [place] Draw    Narrative    The following orders were created for panel order [place] Draw.  Procedure                               Abnormality         Status                     ---------                               -----------         ------                     Extra Blue Top Tube[[id number]]                              Final result               Extra Blood Bank Purple  ...[[id number]]                      Final result                 Please view results for these tests on the individual orders.   Extra Blue Top Tube   Result Value Ref Range    Hold Specimen JIC    Extra Blood Bank Purple Top Tube   Result Value Ref Range    Hold Specimen JIC    CBC with platelets   Result Value Ref Range    WBC Count 5.3 4.0 - 11.0 10e3/uL    RBC Count 3.74 (L) 3.80 - 5.20 10e6/uL    Hemoglobin 12.0 11.7 - 15.7 g/dL    Hematocrit 36.0 35.0 - 47.0 %    MCV 96 78 - 100 fL    MCH 32.1 26.5 - 33.0 pg    MCHC 33.3 31.5 - 36.5 g/dL    RDW 13.0 10.0 - 15.0 %    Platelet Count 201 150 - 450 10e3/uL   CT Chest (PE) Abdomen Pelvis w Contrast    Narrative    EXAM: CT CHEST PE ABDOMEN PELVIS W CONTRAST  LOCATION: [facility]  DATE/TIME: 5/7/2023 12:09 AM CDT    INDICATION: Recent diagnosis of pulmonary embolism. Severe chest, back, and axillary pain.  COMPARISON: CTA chest 04/06/2023. CT abdomen and pelvis 09/02/2022.  TECHNIQUE: CT chest pulmonary angiogram and routine CT abdomen pelvis with IV contrast. Arterial phase through the chest and venous phase through the abdomen and pelvis. Multiplanar reformats and MIP reconstructions were performed. Dose reduction   techniques were used.   CONTRAST:  74 ml Isovue 370    FINDINGS:  ANGIOGRAM CHEST: Small pulmonary embolus within the superior segment of the right lower lobe on 04/06/2023 no longer visualized. No new pulmonary emboli detected. No aortic aneurysm or dissection.    LUNGS AND PLEURA: Small benign calcified granuloma in the right upper lobe. Small polygonal nodule along the right horizontal fissure consistent with benign lymphoid tissue. Bibasilar atelectasis.    MEDIASTINUM/AXILLAE: Small hiatal hernia. Small calcified mediastinal lymph nodes related to benign granulomatous disease.    CORONARY ARTERY CALCIFICATION: None.    HEPATOBILIARY: Normal.    PANCREAS: Normal.    SPLEEN: Normal.    ADRENAL GLANDS:  Normal.    KIDNEYS/BLADDER: Small benign cyst of the right kidney does not warrant follow-up. 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    BOWEL: Mild colonic diverticulosis. No bowel obstruction or inflammation.    LYMPH NODES: Normal.    VASCULATURE: Unremarkable.    PELVIC ORGANS: Fibroid uterus. Minimal free pelvic fluid.    MUSCULOSKELETAL: Plate and screw fixation of the right clavicle. Degenerative changes of the spine.      Impression    IMPRESSION:  1.  Small pulmonary embolus in the right lower lobe on 04/06/2023 has resolved. No new pulmonary emboli.  2.  No other acute process identified in the chest, abdomen or pelvis.   Troponin T, High Sensitivity (now)   Result Value Ref Range    Troponin T, High Sensitivity 233 (HH) <=14 ng/L   Abdomen US, limited (RUQ only)    Narrative    EXAM: US ABDOMEN LIMITED  LOCATION: [facility]  DATE/TIME: 5/7/2023 2:41 AM CDT    INDICATION: Abdominal pain.  COMPARISON: CT abdomen and pelvis earlier today.  TECHNIQUE: Limited abdominal ultrasound.    FINDINGS:    GALLBLADDER: Normal. No gallstones, wall thickening, or pericholecystic fluid. Negative sonographic Catalan's sign.    BILE DUCTS: No biliary dilatation. The common duct measures 3 mm.    LIVER: Normal parenchyma with smooth contour. No focal mass.    RIGHT KIDNEY: 13 mm benign cyst containing some debris. No hydronephrosis.    PANCREAS: The visualized portions are normal.    No ascites.      Impression    IMPRESSION:  No significant abnormality.       Nt probnp inpatient   Result Value Ref Range    N terminal Pro BNP Inpatient 345 0 - 900 pg/mL   Troponin T, High Sensitivity   Result Value Ref Range    Troponin T, High Sensitivity 965 (HH) <=14 ng/L

## 2023-05-07 NOTE — ED PROVIDER NOTES
History     Chief Complaint:  Chest Pain     HPI   Brii Cook is a 72 year old female with a history of PE who presents via EMS with chest pain. EMS reports that the patient had a sudden onset of severe chest pain 20 minutes prior to arrival. In the ED she reports a 7-8/10 pain that radiates into the right armpit, shoulder, and back. Prior to EMS arrival she took regular aspirin. En route to the ED EMS administered three nitro which she reports provided resolution to pain into her armpit but not the shoulder or back. She recently started blood thinning medication due to PE. She denies any personal cardiac or cancer history.     Independent Historian:   EMS - They report HPI and patient status en route to ED    Review of External Notes: Reviewed notes and imaging related to recent PE    ROS:  Review of Systems   See HPI    Allergies:  Codeine  Ampicillin  Cefaclor  Penicillin G  Sulfa Antibiotics  Sulfatolamide     Medications:    Wellbutrin  Trazadone    Past Medical History:    Anxiety  Benign neoplasm of colon  Depression  Kidney stone  Osteopenia  Osteoporosis    Past Surgical History:    Lithotripsy  Appendectomy     Family History:    Father: Esophageal cancer  Mother: Dementia  Sister: Alcoholism, Eating disorder  Brother: Hypertension    Social History:  Patient came from via EMS from home.  Patient is unaccompanied in the ED.  PCP: Marge Rivas     Physical Exam     Patient Vitals for the past 24 hrs:   BP Temp Temp src Pulse Resp SpO2 Height Weight   05/07/23 0130 -- -- -- 69 22 95 % -- --   05/07/23 0115 131/75 -- -- 70 18 96 % -- --   05/07/23 0100 126/77 -- -- 72 18 96 % -- --   05/07/23 0045 123/70 -- -- 74 17 97 % -- --   05/07/23 0030 129/73 -- -- 77 19 96 % -- --   05/07/23 0020 127/75 -- -- 82 21 96 % -- --   05/07/23 0015 127/75 -- -- 79 17 97 % -- --   05/07/23 0010 138/81 -- -- 85 24 96 % -- --   05/07/23 0005 -- -- -- 90 20 96 % -- --   05/06/23 2355 -- -- -- 83 22 95 % --  "--   05/06/23 2350 (!) 140/84 98.1  F (36.7  C) Oral 85 22 95 % 1.702 m (5' 7\") 67.3 kg (148 lb 5.9 oz)   05/06/23 2348 -- -- -- -- -- -- -- 67.3 kg (148 lb 5.9 oz)        Physical Exam  General: Resting on the gurney, appears very uncomfortable  Head:  The scalp, face, and head appear normal  Mouth/Throat: Mucus membranes are moist  CV:  Regular rate    Normal S1 and S2  No pathological murmur   Resp:  Breath sounds clear and equal bilaterally    Non-labored, no retractions or accessory muscle use    No coarseness    No wheezing   GI:  Abdomen is soft, no tenderness, no guarding, no rebound    No tenderness to palpation  MS:  Normal motor assessment of all extremities.    Good capillary refill noted.  Skin:  No rash or lesions noted.  Neuro:   Speech is normal and fluent. No apparent deficit.  Psych: Awake. Alert.  Normal affect.      Appropriate interactions.    Emergency Department Course   ECG  ECG taken at 2345, ECG read at 2350  Sinus rhythm with premature ventricular complexes or fusion complexes  Possible left atrial enlargement  Borderline ECG   Rate 89 bpm. KS interval 162 ms. QRS duration 78 ms. QT/QTc 370/450 ms. P-R-T axes 34 37 38.     ECG  ECG taken at 0033, ECG read at 0045  Sinus rhythm with occasional premature ventricular complexes  Low voltage QRS  ST elevation, consider early repolarization, pericarditis, or injury  Nonspecific ST abnormality  Abnormal ECG   Rate 77 bpm. KS interval 134 ms. QRS duration 86 ms. QT/QTc 392/443 ms. P-R-T axes 22 53 64.     Imaging:  Abdomen US, limited (RUQ only)   Final Result   IMPRESSION:   No significant abnormality.            CT Chest (PE) Abdomen Pelvis w Contrast   Final Result   IMPRESSION:   1.  Small pulmonary embolus in the right lower lobe on 04/06/2023 has resolved. No new pulmonary emboli.   2.  No other acute process identified in the chest, abdomen or pelvis.         Report per radiology    Laboratory:  Labs Ordered and Resulted from Time of ED " Arrival to Time of ED Departure   COMPREHENSIVE METABOLIC PANEL - Abnormal       Result Value    Sodium 140      Potassium 3.0 (*)     Chloride 105      Carbon Dioxide (CO2) 24      Anion Gap 11      Urea Nitrogen 16.1      Creatinine 0.44 (*)     Calcium 7.9 (*)     Glucose 128 (*)     Alkaline Phosphatase 81      AST 23      ALT 16      Protein Total 5.7 (*)     Albumin 3.5      Bilirubin Total <0.2      GFR Estimate >90     TROPONIN T, HIGH SENSITIVITY - Abnormal    Troponin T, High Sensitivity 16 (*)    CBC WITH PLATELETS AND DIFFERENTIAL    WBC Count 5.4      RBC Count 4.04      Hemoglobin 12.7      Hematocrit 38.9      MCV 96      MCH 31.4      MCHC 32.6      RDW 12.9      Platelet Count 210      % Neutrophils 45      % Lymphocytes 38      % Monocytes 13      % Eosinophils 4      % Basophils 0      % Immature Granulocytes 0      NRBCs per 100 WBC 0      Absolute Neutrophils 2.4      Absolute Lymphocytes 2.1      Absolute Monocytes 0.7      Absolute Eosinophils 0.2      Absolute Basophils 0.0      Absolute Immature Granulocytes 0.0      Absolute NRBCs 0.0        Emergency Department Course & Assessments:       Interventions:  Medications   iopamidol (ISOVUE-370) solution 74 mL (74 mLs Intravenous $Given 5/6/23 9866)   Saline Flush (85 mLs Intravenous $Given 5/6/23 5691)        Assessments:  2341 I obtained history and examined the patient as noted above.    Independent Interpretation (X-rays, CTs, rhythm strip):  None    Consultations/Discussion of Management or Tests:  Hospitalist regarding admission        Social Determinants of Health affecting care:   None    Disposition:  The patient was admitted to the hospital under the care of Dr. Craft.     Impression & Plan        Medical Decision Making:    Brii Cook is a 72 year old female who presents with bilateral axillary and right scapular pain.  She did have a recent pulmonary embolism and is currently anticoagulated for this.  The workup in the  Emergency Department (see above for cardiac enzymes and EKG)  is positive for a elevated troponin as well as slight elevation in V5 and V6, though not enough to meet criteria for Cath Lab activation.  CT PE study was obtained given her recent pulmonary embolism in order to evaluate for extension or new thrombus.  The study was extended through her abdomen as she was due to have an abdominal CT done this week and I was concerned that her right scapular pain could have been referred from an intra-abdominal source.  Her CT imaging was unremarkable.  The patient is pain free after nitroglycerin and aspirin.  After discussing with patient the risks and benefits of heparinization and given lack of contraindication to this therapy, heparin bolus and drip were started given suspicion of acute coronary syndrome.      There is no clinical, laboratory, or radiographic evidence of pulmonary embolism, AAA, aortic dissection, pneumonia or pneumothorax.     The patient agrees to be admitted and all questions were answered.        Critical Care time:  was 45 minutes for this patient excluding procedures.    Diagnosis:    ICD-10-CM    1. NSTEMI (non-ST elevated myocardial infarction) (H)  I21.4            Scribe Disclosure:  I, LINDA CHAMBERS, am serving as a scribe at 11:52 PM on 5/6/2023 to document services personally performed by Meghann Velazquez MD based on my observations and the provider's statements to me.      Meghann Velazquez MD  05/07/23 0414

## 2023-05-07 NOTE — ED TRIAGE NOTES
EMS arrival:    Onset of chest pain 20 min ago.  No cardiac hx.  Hx of PE & on medications for this last 5 weeks.    Took regular aspirin prior to EMS arrival.  EMS gave 3 nitroglycerin which helped with the pain under the armpits.  Still complaints of right shoulder pain.

## 2023-05-07 NOTE — ED NOTES
DATE:  5/7/2023   TIME OF RECEIPT FROM LAB:  0239  LAB TEST:  Troponin  LAB VALUE:  233  RESULTS GIVEN WITH READ-BACK TO (PROVIDER):  Meghann Velazquez  TIME LAB VALUE REPORTED TO PROVIDER:  0240

## 2023-05-07 NOTE — CONSULTS
Inpatient Cardiology Consultation.   [facility]  Date of Admission: 5/6/2023  Date of Consult: 5/7/2023    REASON FOR CONSULT:  Acute coronary syndrome.    HISTORY OF PRESENT ILLNESS:  Brii Cook is a 72 year old female who presented late last night with chest pain.  She was diagnosed with pulmonary embolus a month ago with negative hypercoagulable work-up and is currently on rivaroxaban.    Last night she had sudden onset severe chest and back pain radiating to both armpits, when she was at home not doing anything strenuous.  The pain persisted for over 20 minutes.  When EMS arrived, she was given aspirin, initial ECG reportedly showed 1 mm ST elevation in lateral leads (V4-V6) and subsequently improved with nitroglycerin, so STEMI pager was not activated.  She is on IV heparin infusion.      I do not have access to these EKGs and they have not been uploaded to the electronic medical records.  I obtained a stat EKG and it shows sinus rhythm with subtle ST sagging (no elevation) in V4-V6 and leads I and II).  Patient says she has had mild chest pain on and off.  Currently vitally stable with a BP of 135/84, sats 96%, heart rate 64 bpm.    Her serum troponins have progressively gone up.  Normal (16) on arrival and gone up to 233 -- 965 -- 2000.    Other labs are pertinent for hypokalemia of 3.0, normal creatinine of 0.4, normal liver enzymes, normal NT proBNP of 345, normal CBC with a hemoglobin of 12.0.  Lipid panel not available.    She had a repeat chest CT last night.  The previous pulmonary embolus has resolved and there are no new pulmonary emboli.    Stat echocardiogram ordered and pending.    CAD risk factor - patient is 72 years old, weighs 148 pounds, never tobacco user, no hypertension/diabetes/stroke/connective tissue disease/hyperlipidemia/family history of coronary artery disease.    DIAGNOSES:   1.  Aborted lateral ST elevation myocardial infarction with ongoing  chest pain and progressive troponinemia.  2.  Recent small pulmonary embolus, anticoagulated on rivaroxaban, with resolution of pulmonary emboli on CT chest dated 5/7/2023.    ASSESSMENT:   I suspect patient has had an aborted lateral STEMI.  She has ongoing symptoms, progressive increase in troponins.  I recommend urgent coronary angiography.  Discussed with interventional cardiologist, Dr. Lily Darling and STEMI pager activated.    PLAN:  1.  I have activated the STEMI pager and ordered urgent heart catheterization and intervention, as needed.  Risks and benefits discussed and informed consent obtained.  No history of contrast allergy.  Note she is on rivaroxaban and antiplatelet therapy will have to be accordingly tailored.  Patient took last dose of rivaroxaban at 6 PM last night so her risk of bleeding and vascular complications will be higher.  Discussed this with the patient.  Normal renal function and CBC. Full code resuscitation status.    2.  Please continue IV heparin, aspirin, beta-blocker and statin.  3.  I have ordered a stat transthoracic echocardiogram.  4.  IV potassium supplementation to correct hypokalemia.  5.  Cardiology will follow.  Thank you for consulting us.      Total critical care time today 60 minutes.        Selin Yepez MD, MD FACC  Cardiology        CODE STATUS:  Full Code      REVIEW OF SYSTEMS:  A comprehensive 10 point review of systems was completed and the pertinent positives are documented in history of present illness.    FAMILY HISTORY:  Family History   Problem Relation Age of Onset     Coronary Artery Disease No family hx of      Cardiomyopathy No family hx of        MEDICATIONS:  Prior to Admission Medications   Prescriptions Last Dose Informant Patient Reported? Taking?   buPROPion (WELLBUTRIN XL) 150 MG 24 hr tablet 5/6/2023 at morning  Yes Yes   Sig: Take 150 mg by mouth every morning   rivaroxaban ANTICOAGULANT (XARELTO) 20 MG TABS tablet 5/6/2023 at evening   Yes Yes   Sig: Take 20 mg by mouth daily (with dinner)      Facility-Administered Medications: None       HOME MEDICATIONS:  Prior to Admission Medications   Prescriptions Last Dose Informant Patient Reported? Taking?   buPROPion (WELLBUTRIN XL) 150 MG 24 hr tablet 5/6/2023 at morning  Yes Yes   Sig: Take 150 mg by mouth every morning   rivaroxaban ANTICOAGULANT (XARELTO) 20 MG TABS tablet 5/6/2023 at evening  Yes Yes   Sig: Take 20 mg by mouth daily (with dinner)      Facility-Administered Medications: None       ALLERGIES:  Allergies   Allergen Reactions     Codeine Nausea and Vomiting     PN: LW Reaction: Vomiting  PN: LW Reaction: Vomiting       Ampicillin Rash     PN: LW Reaction: Rash, Generalized  PN: LW Reaction: Rash, Generalized       Cefaclor Rash     Has tolerated in the past 09/2022       Penicillin G Rash     Sulfa Antibiotics Unknown and Rash     PN: LW Reaction: Unknown Reaction       Sulfatolamide Rash       PAST MEDICAL HISTORY:  Past Medical History:   Diagnosis Date     Depression      Pulmonary embolism (H)        PAST SURGICAL HISTORY:  Past Surgical History:   Procedure Laterality Date     NO HISTORY OF SURGERY         SOCIAL HISTORY:   Brii Cook        PHYSICAL EXAMINATION:  Temp: 98.1  F (36.7  C) Temp src: Oral BP: 135/84 Pulse: 67   Resp: 25 SpO2: 96 % O2 Device: None (Room air)    No intake/output data recorded.  Vitals:    05/06/23 2348 05/06/23 2350   Weight: 67.3 kg (148 lb 5.9 oz) 67.3 kg (148 lb 5.9 oz)       Constitutional: Comfortable at rest. Cooperative, alert, well developed, well nourished. Normal BMI.  Eyes: Pupils reactive, no pallor or icterus.  ENT: No cyanosis or pallor.  Moist mucous membranes.  Cardiovascular: Normal jugular venous pulse and pressure.  Normal carotid pulse character and volume.  No carotid bruit.  Normal apical impulse.  Regular heart sounds.  No S3 or S4.  No murmur or friction rub.  Respiratory: Normal respiratory effort with symmetrical  chest wall movements and no use of accessory muscles. Bilateral normal breath sounds. No rales or wheeze.  GI: Soft, nontender, active bowel sounds.  No hepatosplenomegaly, ascites or abdominal wall edema.  Skin: No erythema or ecchymosis.   Musculoskeletal: Normal muscle tone and strength.   Neuropsychiatric: Oriented to time place and person.  Affect normal.  No gross motor deficits.  Extremities: No edema or clubbing.    Clinically Significant Risk Factors Present on Admission        # Hypokalemia: Lowest K = 3 mmol/L in last 2 days, will replace as needed   # Hypocalcemia: Lowest Ca = 7.9 mg/dL in last 2 days, will monitor and replace as appropriate      # Drug Induced Coagulation Defect: home medication list includes an anticoagulant medication                Pulmonary Embolism: Other pulmonary embolism without acute cor pulmonale    [facility] Kell Yepez MD, MD

## 2023-05-07 NOTE — PROGRESS NOTES
[facility]    Non billing     Medicine Progress Note - Hospitalist Service    Date of Admission:  5/6/2023    Assessment & Plan     This is a 72-year-old female with no past medical history of any coronary artery disease and was diagnosed with PE on 4/6 and is on Xarelto and had negative hypercoagulable work-up at that time presented with chest pain which was radiating to the back and her EKG was concerning for ST elevation in V4 and V6 and her pain improved and resolved with nitroglycerin and CTA chest was ordered last night which showed small pulmonary embolism in the right lower lobe had resolved and no new pulmonary emboli and no acute process identified in the chest abdomen or pelvis and she was started on heparin drip and cardiology was consulted    Chest pain with STEMI  -Presented with chest pain which had atypical features and her initial EKG showedelevation V4-V6, subsequently improved with NTG  -CTA chest did not show any evidence of new pulmonary emboli  -Cardiology was consulted and patient was chest pain-free and was started on heparin drip, metoprolol 25 mg twice daily, Lipitor 20 mg and nitroglycerin patch was ordered  -Patient was evaluated by cardiology team this morning and she had return of chest pain and was taken to Cath Lab  -echo has been ordered    Addendum     LHC was done which shows  Lat 1st Mrg lesion is 90% stenosed.     Left ventricular filling pressures are mildly elevated.     1. 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  The dissection is in the distal portion of the vessel, and the vessel is very tortuous.  2. There is no coronary atherosclerosis appreciated.   3. LVEDP is mildly elevated, 17mmHg.   4. A run of sustained monomorphic ventricular tachycardia was noted during the procedure which resolved without intervention and was not associated with symptoms or hemodynamic compromise.     -Continue patient with 81  mg aspirin daily for 1 month, then stop, inpatient monitoring for about 3 days and heparin can be resumed at 4 PM            Hypokalemia  -She did have potassium of 3 and was started on replacement protocol and repeat levels are pending and we will continue to monitor    Recent small right lower lobe unprovoked PE on 4/6/2023  -She is currently on Xarelto at home and CTA chest showed that unprovoked PE has resolved and we will continue the patient with heparin drip and hold Xarelto and she will need to follow-up with hematology as outpatient    Depression/anxiety  -We will continue with PTA trazodone and Wellbutrin       Diet: NPO for Medical/Clinical Reasons Except for: Meds    DVT Prophylaxis: heparin drip   Horton Catheter: Not present  Lines: None     Cardiac Monitoring: ACTIVE order. Indication: AMI (NSTEMI/ STEMI) (48 hours)  Code Status: Full Code      Clinically Significant Risk Factors Present on Admission        # Hypokalemia: Lowest K = 3 mmol/L in last 2 days, will replace as needed   # Hypocalcemia: Lowest Ca = 7.9 mg/dL in last 2 days, will monitor and replace as appropriate      # Drug Induced Coagulation Defect: home medication list includes an anticoagulant medication                 Disposition Plan      Expected Discharge Date: 05/09/2023                  Daxa Garland MD  Hospitalist Service  [facility]  Securely message with [employer] (more info)  Text page via [employer] Paging/Directory   ______________________________________________________________________    Interval History     Saw the patient this morning and her  was with him and daughter Coco was present and patient was already seen by cardiology team and Cath Lab was activated    Patient mentioned that she was having similar chest pain which started about half an hour before I saw her and was not notified and she also had the back pain.  She denies any lightheadedness dizziness or nausea or diaphoresis and was  on board to do the angiogram done.    I also discussed the plan of care with patient's nurse, , patient and daughter and their questions were answered to satisfaction    Physical Exam   Vital Signs: Temp: 98.1  F (36.7  C) Temp src: Oral BP: (!) 130/98 Pulse: 61   Resp: 15 SpO2: 96 % O2 Device: Nasal cannula    Weight: 148 lbs 5.91 oz        General: Patient appears comfortable and in no acute distress.  HEENT: Head is atraumatic, normocephalic.  Pupils are equal, round and reactive to light.  No scleral icterus. Oral mucosa is moist   Neck: Neck is supple and No Lymphadenopathy   Respiratory: Lungs are clear to auscultation bilaterally with no wheeze or crackles   Cardiovascular: Regular rate , S1 and S2 normal with no murmer or rubs or gallops  Abdomen:   soft , non tender , non distended and bowel sound present   Skin: No skin rashes or lesions to inspection or palpation.  Neurologic: Higher functions are within normal limits. No obvious defects in speech, language and memory. No facial droop  Musculoskeletal: Normal Range of motion over upper and lower extremities bilaterally   Psychiatric: cooperative     Medical Decision Making             Data     I have personally reviewed the following data over the past 24 hrs:    5.3  \   12.0   / 201     140 105 16.1 /  128 (H)   3.0 (L) 24 0.44 (L) \       ALT: 16 AST: 23 AP: 81 TBILI: <0.2   ALB: 3.5 TOT PROTEIN: 5.7 (L) LIPASE: N/A       Trop: 1,999 () BNP: 345       INR:  N/A PTT:  160 ()   D-dimer:  N/A Fibrinogen:  N/A       Imaging results reviewed over the past 24 hrs:   Recent Results (from the past 24 hour(s))   CT Chest (PE) Abdomen Pelvis w Contrast    Narrative    EXAM: CT CHEST PE ABDOMEN PELVIS W CONTRAST  LOCATION: [facility]  DATE/TIME: 5/7/2023 12:09 AM CDT    INDICATION: Recent diagnosis of pulmonary embolism. Severe chest, back, and axillary pain.  COMPARISON: CTA chest 04/06/2023. CT abdomen and pelvis  09/02/2022.  TECHNIQUE: CT chest pulmonary angiogram and routine CT abdomen pelvis with IV contrast. Arterial phase through the chest and venous phase through the abdomen and pelvis. Multiplanar reformats and MIP reconstructions were performed. Dose reduction   techniques were used.   CONTRAST:  74 ml Isovue 370    FINDINGS:  ANGIOGRAM CHEST: Small pulmonary embolus within the superior segment of the right lower lobe on 04/06/2023 no longer visualized. No new pulmonary emboli detected. No aortic aneurysm or dissection.    LUNGS AND PLEURA: Small benign calcified granuloma in the right upper lobe. Small polygonal nodule along the right horizontal fissure consistent with benign lymphoid tissue. Bibasilar atelectasis.    MEDIASTINUM/AXILLAE: Small hiatal hernia. Small calcified mediastinal lymph nodes related to benign granulomatous disease.    CORONARY ARTERY CALCIFICATION: None.    HEPATOBILIARY: Normal.    PANCREAS: Normal.    SPLEEN: Normal.    ADRENAL GLANDS: Normal.    KIDNEYS/BLADDER: Small benign cyst of the right kidney does not warrant follow-up. 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    BOWEL: Mild colonic diverticulosis. No bowel obstruction or inflammation.    LYMPH NODES: Normal.    VASCULATURE: Unremarkable.    PELVIC ORGANS: Fibroid uterus. Minimal free pelvic fluid.    MUSCULOSKELETAL: Plate and screw fixation of the right clavicle. Degenerative changes of the spine.      Impression    IMPRESSION:  1.  Small pulmonary embolus in the right lower lobe on 04/06/2023 has resolved. No new pulmonary emboli.  2.  No other acute process identified in the chest, abdomen or pelvis.   Abdomen US, limited (RUQ only)    Narrative    EXAM: US ABDOMEN LIMITED  LOCATION: [facility]  DATE/TIME: 5/7/2023 2:41 AM CDT    INDICATION: Abdominal pain.  COMPARISON: CT abdomen and pelvis earlier today.  TECHNIQUE: Limited abdominal ultrasound.    FINDINGS:    GALLBLADDER: Normal. No gallstones,  wall thickening, or pericholecystic fluid. Negative sonographic Catalan's sign.    BILE DUCTS: No biliary dilatation. The common duct measures 3 mm.    LIVER: Normal parenchyma with smooth contour. No focal mass.    RIGHT KIDNEY: 13 mm benign cyst containing some debris. No hydronephrosis.    PANCREAS: The visualized portions are normal.    No ascites.      Impression    IMPRESSION:  No significant abnormality.

## 2023-05-07 NOTE — ED NOTES
.  [facility]  ED Nurse Handoff Report    ED Chief complaint: Chest Pain      ED Diagnosis:   Final diagnoses:   NSTEMI (non-ST elevated myocardial infarction) (H)       Code Status: Full Code    Allergies:   Allergies   Allergen Reactions    Codeine Nausea and Vomiting     PN: LW Reaction: Vomiting  PN: LW Reaction: Vomiting      Ampicillin Rash     PN: LW Reaction: Rash, Generalized  PN: LW Reaction: Rash, Generalized      Cefaclor Rash     Has tolerated in the past 09/2022      Penicillin G Rash    Sulfa Antibiotics Unknown and Rash     PN: LW Reaction: Unknown Reaction      Sulfatolamide Rash       Patient Story: Onset of chest pain 20 min ago.  No cardiac hx.  Hx of PE & on medications for this last 5 weeks.    Took regular aspirin prior to EMS arrival.  EMS gave 3 nitroglycerin which helped with the pain under the armpits.  Still complaints of right shoulder pain  Focused Assessment:  AxO 4, RA, SBA w/G, Pain management see EMAr, HEP @ 800, R PIV AC, VSS    Treatments and/or interventions provided: Labs, US  Patient's response to treatments and/or interventions: WNL    To be done/followed up on inpatient unit:  N/A    Does this patient have any cognitive concerns?: None    Activity level - Baseline/Home:  Independent  Activity Level - Current:   Stand with Assist    Patient's Preferred language: English   Needed?: No    Isolation: None  Infection: Not Applicable  Patient tested for COVID 19 prior to admission: NO  Bariatric?: No    Vital Signs:   Vitals:    05/07/23 0130 05/07/23 0200 05/07/23 0300 05/07/23 0400   BP:  131/74 123/71 (!) 145/78   Pulse: 69 71 70 78   Resp: 22 (!) 32 18 20   Temp:       TempSrc:       SpO2: 95% 95% 95% 93%   Weight:       Height:           Cardiac Rhythm:     Was the PSS-3 completed:   Yes  What interventions are required if any?         For the majority of the shift this patient's behavior was Green.   Behavioral interventions performed were  None.    ED NURSE PHONE NUMBER: *[phone]

## 2023-05-07 NOTE — PROGRESS NOTES
Due to a conflicting order in [facility] I was not able to place an order to resume heparin.  She should currently not be on a heparin drip post angiogram.    I spoke with the ICU pharmacist who will place the order to resume heparin low intensity with no initial bolus but subsequent boluses to maintain adequate anticoagulation.    Do not resume heparin until 4 PM and only resume if radial access site is stable.    Lily Darling MD on 5/7/2023 at 12:11 PM

## 2023-05-07 NOTE — PLAN OF CARE
Pt a/o, able to make needs known. Denies pain. VSS. Right radial site free of bleeding/hematoma. Daughter and  updated at bedside.  Voiding and tolerating fluids. Satisfactory report given to JETT Smith. Cont to monitor.   Problem: Plan of Care - These are the overarching goals to be used throughout the patient stay.    Goal: Absence of Hospital-Acquired Illness or Injury  Outcome: Met  Intervention: Identify and Manage Fall Risk  Recent Flowsheet Documentation  Taken 5/7/2023 1200 by Candelaria Alexandre RN  Safety Promotion/Fall Prevention:   activity supervised   clutter free environment maintained   increased rounding and observation   increase visualization of patient   lighting adjusted   room near nurse's station   safety round/check completed   treat reversible contributory factors  Intervention: Prevent Skin Injury  Recent Flowsheet Documentation  Taken 5/7/2023 1400 by Candelaria Alexandre RN  Body Position: position changed independently  Taken 5/7/2023 1200 by Candelaria Alexandre RN  Body Position:   left   tilted     Problem: Cardiac Catheterization (Diagnostic/Interventional)  Goal: Stable Heart Rate and Rhythm  Outcome: Met     Problem: Cardiac Catheterization (Diagnostic/Interventional)  Goal: Absence of Bleeding  Outcome: Met   Goal Outcome Evaluation:

## 2023-05-08 ENCOUNTER — APPOINTMENT (OUTPATIENT)
Dept: CARDIOLOGY | Facility: CLINIC | Age: 73
DRG: 280 | End: 2023-05-08
Attending: INTERNAL MEDICINE
Payer: COMMERCIAL

## 2023-05-08 LAB
ANION GAP SERPL CALCULATED.3IONS-SCNC: 7 MMOL/L (ref 7–15)
APTT PPP: 49 SECONDS (ref 22–38)
APTT PPP: 66 SECONDS (ref 22–38)
APTT PPP: 72 SECONDS (ref 22–38)
APTT PPP: 91 SECONDS (ref 22–38)
BUN SERPL-MCNC: 9.9 MG/DL (ref 8–23)
CALCIUM SERPL-MCNC: 8.6 MG/DL (ref 8.8–10.2)
CHLORIDE SERPL-SCNC: 107 MMOL/L (ref 98–107)
CREAT SERPL-MCNC: 0.75 MG/DL (ref 0.51–0.95)
DEPRECATED HCO3 PLAS-SCNC: 25 MMOL/L (ref 22–29)
ERYTHROCYTE [DISTWIDTH] IN BLOOD BY AUTOMATED COUNT: 13.2 % (ref 10–15)
GFR SERPL CREATININE-BSD FRML MDRD: 83 ML/MIN/1.73M2
GLUCOSE SERPL-MCNC: 101 MG/DL (ref 70–99)
HCT VFR BLD AUTO: 40.1 % (ref 35–47)
HGB BLD-MCNC: 13.3 G/DL (ref 11.7–15.7)
LVEF ECHO: NORMAL
MCH RBC QN AUTO: 31.3 PG (ref 26.5–33)
MCHC RBC AUTO-ENTMCNC: 33.2 G/DL (ref 31.5–36.5)
MCV RBC AUTO: 94 FL (ref 78–100)
PLATELET # BLD AUTO: 184 10E3/UL (ref 150–450)
POTASSIUM SERPL-SCNC: 3.8 MMOL/L (ref 3.4–5.3)
RBC # BLD AUTO: 4.25 10E6/UL (ref 3.8–5.2)
SODIUM SERPL-SCNC: 139 MMOL/L (ref 136–145)
WBC # BLD AUTO: 5.9 10E3/UL (ref 4–11)

## 2023-05-08 PROCEDURE — 85730 THROMBOPLASTIN TIME PARTIAL: CPT | Performed by: INTERNAL MEDICINE

## 2023-05-08 PROCEDURE — 250N000013 HC RX MED GY IP 250 OP 250 PS 637: Performed by: HOSPITALIST

## 2023-05-08 PROCEDURE — 85027 COMPLETE CBC AUTOMATED: CPT | Performed by: HOSPITALIST

## 2023-05-08 PROCEDURE — 36415 COLL VENOUS BLD VENIPUNCTURE: CPT | Performed by: INTERNAL MEDICINE

## 2023-05-08 PROCEDURE — 250N000013 HC RX MED GY IP 250 OP 250 PS 637: Performed by: INTERNAL MEDICINE

## 2023-05-08 PROCEDURE — 210N000002 HC R&B HEART CARE

## 2023-05-08 PROCEDURE — 999N000208 ECHOCARDIOGRAM COMPLETE

## 2023-05-08 PROCEDURE — 255N000002 HC RX 255 OP 636: Performed by: INTERNAL MEDICINE

## 2023-05-08 PROCEDURE — 250N000013 HC RX MED GY IP 250 OP 250 PS 637: Performed by: NURSE PRACTITIONER

## 2023-05-08 PROCEDURE — 80048 BASIC METABOLIC PNL TOTAL CA: CPT | Performed by: HOSPITALIST

## 2023-05-08 PROCEDURE — 99232 SBSQ HOSP IP/OBS MODERATE 35: CPT | Performed by: HOSPITALIST

## 2023-05-08 PROCEDURE — 93306 TTE W/DOPPLER COMPLETE: CPT | Mod: 26 | Performed by: INTERNAL MEDICINE

## 2023-05-08 PROCEDURE — 99233 SBSQ HOSP IP/OBS HIGH 50: CPT | Mod: 25 | Performed by: NURSE PRACTITIONER

## 2023-05-08 RX ORDER — METOPROLOL TARTRATE 25 MG/1
25 TABLET, FILM COATED ORAL 3 TIMES DAILY
Status: DISCONTINUED | OUTPATIENT
Start: 2023-05-08 | End: 2023-05-09

## 2023-05-08 RX ADMIN — ASPIRIN 81 MG: 81 TABLET, COATED ORAL at 09:41

## 2023-05-08 RX ADMIN — METOPROLOL TARTRATE 25 MG: 25 TABLET, FILM COATED ORAL at 21:50

## 2023-05-08 RX ADMIN — HUMAN ALBUMIN MICROSPHERES AND PERFLUTREN 9 ML: 10; .22 INJECTION, SOLUTION INTRAVENOUS at 09:00

## 2023-05-08 RX ADMIN — METOPROLOL TARTRATE 25 MG: 25 TABLET, FILM COATED ORAL at 09:41

## 2023-05-08 RX ADMIN — BUPROPION HYDROCHLORIDE 150 MG: 150 TABLET, FILM COATED, EXTENDED RELEASE ORAL at 09:41

## 2023-05-08 RX ADMIN — METOPROLOL TARTRATE 25 MG: 25 TABLET, FILM COATED ORAL at 17:15

## 2023-05-08 ASSESSMENT — ACTIVITIES OF DAILY LIVING (ADL)
ADLS_ACUITY_SCORE: 36
CONCENTRATING,_REMEMBERING_OR_MAKING_DECISIONS_DIFFICULTY: NO
ADLS_ACUITY_SCORE: 36
DRESSING/BATHING_DIFFICULTY: NO
ADLS_ACUITY_SCORE: 19
ADLS_ACUITY_SCORE: 35
DOING_ERRANDS_INDEPENDENTLY_DIFFICULTY: NO
ADLS_ACUITY_SCORE: 20
ADLS_ACUITY_SCORE: 35
ADLS_ACUITY_SCORE: 35
ADLS_ACUITY_SCORE: 19
WEAR_GLASSES_OR_BLIND: NO
FALL_HISTORY_WITHIN_LAST_SIX_MONTHS: NO
TOILETING_ISSUES: NO
ADLS_ACUITY_SCORE: 35
ADLS_ACUITY_SCORE: 21
WALKING_OR_CLIMBING_STAIRS_DIFFICULTY: NO
DIFFICULTY_EATING/SWALLOWING: NO

## 2023-05-08 NOTE — PROGRESS NOTES
[facility]  Cardiology Progress Note  Date of Service: 05/08/2023  Date of Admission: 5/6/2023  Inpatient Cardiologist: Dr. Siddiqui    Summary    This is a 72-year-old female with no past medical history of any coronary artery disease and was diagnosed with PE on 4/6 and is on Xarelto and had negative hypercoagulable work-up at that time presented with chest pain which was radiating to the back and her EKG was concerning for ST elevation in V4 and V6 and her pain improved and resolved with nitroglycerin and CTA chest was ordered last night which showed small pulmonary embolism in the right lower lobe had resolved and no new pulmonary emboli and no acute process identified in the chest abdomen or pelvis and she was started on heparin drip and cardiology was consulted.    Interval May 8, 2023    Patient is feeling well this morning.  She has no focal complaints, and specifically denies recurrent chest pain or pressure at rest or with exertion.  Together, we discussed the pathophysiology of SCAD with this pleasant patient, and plan of care, including inpatient stay for at least 1-2 more days.  She is understanding and agreeable to this plan.  Denies CP, LH or dizziness, SOB, ZAPATA.    Asssessment:    Chest pain with STEMI   S/p LHC on 5/7/2023  SCAD  -No current symptoms, CP free.  -Initial EKG showed elevation V4-V6, subsequently improved with NTG  -LHC on 5/7/2023: spontaneous dissection of OM1 branch of the left circumflex.  The dissection is in the distal portion of the vessel, and the vessel is very tortuous, no coronary atherosclerosis appreciated, LVEDP is mildly elevated, 17 mmHg.  Lat 1st Mrg lesion is 90% stenosed.  -Notably, a run of sustained monomorphic ventricular tachycardia was noted during the procedure, resolved without intervention and was not associated with symptoms or hemodynamic compromise.  -No VT recurrence on telemetry review.  -Hb stable.  -On ASA 81 mg, lopressor 25  mg BID.  -Echocardiogram pending.    Recent small right lower lobe unprovoked PE on 4/6/2023  -Xarelto on hold in current setting.  On Heparin gtt.  -CTA chest showed that unprovoked PE has resolved     Depression/anxiety, on PTA Wellbutrin  _____________________________________________________________    Plan:     Echocardiogram today.    Continue heparin gtt ~48-h post cath before resuming anticoagulation, then ultimately will resume Xarelto.    Continue telemetry --> high risk for VT recurrence.    Ok to ambulate if asymptomatic.    Outpatient follow up and evaluation for fibromuscular dysplasia and consideration for screening for genetic connective tissue disorder recommended.    Anticipate at least another 1-2 days of inpatient monitoring.  _____________________________________________________________    Discharge Disposition:  1-2 days, anticipate home.    Thank you for the opportunity to participate in the care of Ms. Brii Cook.  Please feel free to reach out with any additional questions.    JF Gold, CNP   Nurse Practitioner  [facility]  Pager: [phone]  Phone: [phone]  Text Page (8am - 5pm, M-F)    Physical Exam :  Temp: 97.9  F (36.6  C) Temp src: Oral BP: 121/73 Pulse: 72   Resp: 18 SpO2: 96 % O2 Device: None (Room air)      Vitals:    05/06/23 2348 05/06/23 2350 05/08/23 0628   Weight: 67.3 kg (148 lb 5.9 oz) 67.3 kg (148 lb 5.9 oz) 62.6 kg (138 lb)     I/O last 3 completed shifts:  In: 200 [P.O.:200]  Out: -     Constitutional: Appears stated age, well nourished, NAD.  Eyes: Pupils equal, round. Sclerae anicteric.   HEENT: Normocephalic, atraumatic.   Neck: Supple. Carotid pulses full and equal. No JVD appreciated.  Respiratory: Non-labored. Lungs CTAB.  Cardiovascular: RRR, normal S1 and S2. No M/G/R.  Vascular: Peripheral pulses intact and symmetric bilaterally; RRA site stable.  GI: Soft, non-distended, non-tender, bowel sounds present  equally.  Skin: Warm and dry. No rashes, cyanosis, edema.  Musculoskeletal/Extremities: Symmetrical movement. No edema.  Neurologic: No gross focal deficits. Alert, awake, and oriented to all spheres.  Psychiatric: Affect appropriate. Mentation normal.    Data   Recent Labs   Lab 05/08/23  0737 05/07/23  1215 05/06/23 2349 05/06/23  2347   WBC 5.9  --  5.3 5.4   HGB 13.3  --  12.0 12.7   MCV 94  --  96 96     --  201 210     --   --  140   POTASSIUM 3.8 3.8  --  3.0*   CHLORIDE 107  --   --  105   CO2 25  --   --  24   BUN 9.9  --   --  16.1   CR 0.75  --   --  0.44*   ANIONGAP 7  --   --  11   DENNY 8.6*  --   --  7.9*   *  --   --  128*   ALBUMIN  --   --   --  3.5   PROTTOTAL  --   --   --  5.7*   BILITOTAL  --   --   --  <0.2   ALKPHOS  --   --   --  81   ALT  --   --   --  16   AST  --   --   --  23       Recent Labs   Lab 05/08/23  0737 05/06/23 2349 05/06/23  2347   WBC 5.9 5.3 5.4   HGB 13.3 12.0 12.7   HCT 40.1 36.0 38.9   MCV 94 96 96    201 210     Recent Labs   Lab 05/08/23  0737 05/07/23  1215 05/06/23  2347     --  140   POTASSIUM 3.8 3.8 3.0*   CHLORIDE 107  --  105   CO2 25  --  24   ANIONGAP 7  --  11   *  --  128*   BUN 9.9  --  16.1   CR 0.75  --  0.44*   GFRESTIMATED 83  --  >90   DENNY 8.6*  --  7.9*        Patient Active Problem List   Diagnosis     Acute ST elevation myocardial infarction (STEMI) (H)     NSTEMI (non-ST elevated myocardial infarction) (H)     Medications     heparin 800 Units/hr (05/08/23 0110)     - MEDICATION INSTRUCTIONS -       - MEDICATION INSTRUCTIONS -         aspirin  81 mg Oral Daily     buPROPion  150 mg Oral QAM     metoprolol tartrate  25 mg Oral BID     sodium chloride (PF)  3 mL Intracatheter Q8H       Data   Last 24 hours labs personally reviewed.  Echo: No results found for this or any previous visit (from the past 4320 hour(s)).

## 2023-05-08 NOTE — PLAN OF CARE
Care plan note:      Recent Vitals:  Temp: 98.4  F (36.9  C) Temp src: Oral BP: 116/77 Pulse: 72   Resp: 19 SpO2: 97 % O2 Device: None (Room air)      Orientation/Neuro: Alert and Oriented x 4  Pain: The patient is not having any pain.   Tele: Sinus rhythm    IV medications: Heparin   Mobility: up Independently   Skin: Radial site: Right.   GI: WDL  : WDL     Diet: Tolerating diet:   Well  Orders Placed This Encounter      Regular Diet Adult      Safety/Concerns:  None  Aggression Color: Green    Plan: Pt denies chest pain, radial site CDI. Heparin drip infusing. Continue to monitor.    Continue to monitor.      Uday Benson RN

## 2023-05-08 NOTE — PROGRESS NOTES
[facility]    Non billing     Medicine Progress Note - Hospitalist Service    Date of Admission:  5/6/2023    Assessment & Plan     This is a 72-year-old female with no past medical history of any coronary artery disease and was diagnosed with PE on 4/6 and is on Xarelto and had negative hypercoagulable work-up at that time presented with chest pain which was radiating to the back and her EKG was concerning for ST elevation in V4 and V6 and her pain improved and resolved with nitroglycerin and CTA chest was ordered last night which showed small pulmonary embolism in the right lower lobe had resolved and no new pulmonary emboli and no acute process identified in the chest abdomen or pelvis and she was started on heparin drip and cardiology was consulted    Chest pain with STEMI S/P LHC on 5/7/2023  SCAD  -Presented with chest pain which had atypical features and her initial EKG showedelevation V4-V6, subsequently improved with NTG  -CTA chest did not show any evidence of new pulmonary emboli  - LHC on 5/7/2023 showed Spontaneous dissection of the first obtuse marginal branch of the left circumflex.  The dissection is in the distal portion of the vessel, and the vessel is very tortuous, no coronary atherosclerosis appreciated, LVEDP is mildly elevated, 17mmHg, A run of sustained monomorphic ventricular tachycardia was noted during the procedure which resolved without intervention and was not associated with symptoms or hemodynamic compromise and  Lat 1st Mrg lesion is 90% stenosed.  -Continue patient with 81 mg aspirin daily for 1 month, then stop, inpatient monitoring for about 3 days and heparin drip for now   -Hb has been stable this am   - echo is pending   - we will continue with asa , statin not recommended as per intervention cardiology, beta blocker   -I did speak with Dr. Garcia from cardiology team in person and continue to monitor her clinically and she is on heparin drip from PE  standpoint and given her high risk of bleeding we will need to continue heparin drip for the next few days      Hypokalemia-resolved     Recent small right lower lobe unprovoked PE on 4/6/2023  -She is currently on Xarelto at home and CTA chest showed that unprovoked PE has resolved and we will continue the patient with heparin drip and hold Xarelto and she will need to follow-up with hematology as outpatient    Mild hypocalcemia   - we will order calcium gluconate     Depression/anxiety  -We will continue with PTA  Wellbutrin       Diet: Regular Diet Adult    DVT Prophylaxis: heparin drip   Horton Catheter: Not present  Lines: None     Cardiac Monitoring: ACTIVE order. Indication: Post- PCI/Angiogram (24 hours)  Code Status: Full Code      Clinically Significant Risk Factors        # Hypokalemia: Lowest K = 3 mmol/L in last 2 days, will replace as needed   # Hypocalcemia: Lowest Ca = 7.9 mg/dL in last 2 days, will monitor and replace as appropriate                        Disposition Plan      Expected Discharge Date: 05/09/2023                  Daxa Garland MD  Hospitalist Service  [facility]  Securely message with [employer] (more info)  Text page via [employer] Paging/Directory   ______________________________________________________________________    Interval History     Seen today and has no chest pain or sob  No nausea or vomiting   No fever or chills   Has questions which were answered to satisfaction    Discussed with RN, care coordinator team, cardiology    Physical Exam   Vital Signs: Temp: 97.9  F (36.6  C) Temp src: Oral BP: 121/73 Pulse: 72   Resp: 18 SpO2: 96 % O2 Device: None (Room air)    Weight: 138 lbs 0 oz        General: AOx3  Neck: Neck is supple   Respiratory: Lungs are clear to auscultation bilaterally with no wheeze or crackles   Cardiovascular: Regular rate , S1 and S2 normal with no murmer or rubs or gallops  Abdomen:   soft , non tender , non distended and bowel sound  present   Neurologic: No facial droop  Musculoskeletal: Normal Range of motion over upper and lower extremities bilaterally   Psychiatric: cooperative     Medical Decision Making             Data     I have personally reviewed the following data over the past 24 hrs:    5.9  \   13.3   / 184     139 107 9.9 /  101 (H)   3.8 25 0.75 \       INR:  N/A PTT:  91 (H)   D-dimer:  N/A Fibrinogen:  N/A       Imaging results reviewed over the past 24 hrs:   Recent Results (from the past 24 hour(s))   Cardiac Catheterization    Addendum: 5/7/2023         Lat 1st Mrg lesion is 90% stenosed.     Left ventricular filling pressures are mildly elevated.    1. 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  The dissection is in the distal portion of the vessel, and the vessel is very tortuous.  2. There is no coronary atherosclerosis appreciated.   3. LVEDP is mildly elevated, 17mmHg.   A run of sustained monomorphic ventricular tachycardia was noted during the procedure which resolved without intervention and was not associated with symptoms or hemodynamic compromise.       Narrative       Lat 1st Mrg lesion is 90% stenosed.     Left ventricular filling pressures are mildly elevated.    Spontaneous dissection of the first obtuse marginal branch of the left   circumflex.  The dissection is in the distal portion of the vessel, and   the vessel is very tortuous.  There is no coronary atherosclerosis appreciated.   LVEDP is mildly elevated, 17mmHg.   A run of sustained monomorphic ventricular tachycardia was noted during   the procedure which resolved without intervention and was not associated   with symptoms or hemodynamic compromise.

## 2023-05-08 NOTE — PLAN OF CARE
Neuro: PERRLA.  Moves all extremities.  Follows commands.  Behavior:  A/Ox4, calm, pleasant, cooperative  Cardiovascular: NSR, RRR.  Hemodynamically stable.  Pulmonary:  Stable SPO2s on RA  GI: regular diet   :  Adequate UOP  Skin:  Intact except some ecchymoses at right radial site, CMS intact  IV/Drips:  PIV, R ac, low intensity Heparin at 800u/hr    Intake/Output Summary (Last 24 hours) at 5/8/2023 0312  Last data filed at 5/7/2023 1200  Gross per 24 hour   Intake 200 ml   Output --   Net 200 ml

## 2023-05-09 ENCOUNTER — APPOINTMENT (OUTPATIENT)
Dept: CARDIOLOGY | Facility: CLINIC | Age: 73
DRG: 280 | End: 2023-05-09
Attending: NURSE PRACTITIONER
Payer: COMMERCIAL

## 2023-05-09 ENCOUNTER — APPOINTMENT (OUTPATIENT)
Dept: PHYSICAL THERAPY | Facility: CLINIC | Age: 73
DRG: 280 | End: 2023-05-09
Attending: NURSE PRACTITIONER
Payer: COMMERCIAL

## 2023-05-09 ENCOUNTER — APPOINTMENT (OUTPATIENT)
Dept: PHYSICAL THERAPY | Facility: CLINIC | Age: 73
DRG: 280 | End: 2023-05-09
Payer: COMMERCIAL

## 2023-05-09 VITALS
WEIGHT: 138.9 LBS | HEART RATE: 71 BPM | DIASTOLIC BLOOD PRESSURE: 74 MMHG | RESPIRATION RATE: 18 BRPM | BODY MASS INDEX: 21.8 KG/M2 | HEIGHT: 67 IN | SYSTOLIC BLOOD PRESSURE: 112 MMHG | OXYGEN SATURATION: 96 % | TEMPERATURE: 98.3 F

## 2023-05-09 LAB
ANION GAP SERPL CALCULATED.3IONS-SCNC: 8 MMOL/L (ref 7–15)
APTT PPP: 67 SECONDS (ref 22–38)
BUN SERPL-MCNC: 14.4 MG/DL (ref 8–23)
CALCIUM SERPL-MCNC: 8.5 MG/DL (ref 8.8–10.2)
CHLORIDE SERPL-SCNC: 106 MMOL/L (ref 98–107)
CREAT SERPL-MCNC: 0.73 MG/DL (ref 0.51–0.95)
DEPRECATED HCO3 PLAS-SCNC: 26 MMOL/L (ref 22–29)
GFR SERPL CREATININE-BSD FRML MDRD: 86 ML/MIN/1.73M2
GLUCOSE SERPL-MCNC: 95 MG/DL (ref 70–99)
POTASSIUM SERPL-SCNC: 3.9 MMOL/L (ref 3.4–5.3)
SODIUM SERPL-SCNC: 140 MMOL/L (ref 136–145)

## 2023-05-09 PROCEDURE — 97161 PT EVAL LOW COMPLEX 20 MIN: CPT | Mod: GP

## 2023-05-09 PROCEDURE — 93270 REMOTE 30 DAY ECG REV/REPORT: CPT

## 2023-05-09 PROCEDURE — 99239 HOSP IP/OBS DSCHRG MGMT >30: CPT | Performed by: HOSPITALIST

## 2023-05-09 PROCEDURE — 80048 BASIC METABOLIC PNL TOTAL CA: CPT | Performed by: HOSPITALIST

## 2023-05-09 PROCEDURE — 85730 THROMBOPLASTIN TIME PARTIAL: CPT | Performed by: HOSPITALIST

## 2023-05-09 PROCEDURE — 99232 SBSQ HOSP IP/OBS MODERATE 35: CPT | Mod: 25 | Performed by: NURSE PRACTITIONER

## 2023-05-09 PROCEDURE — 250N000013 HC RX MED GY IP 250 OP 250 PS 637: Performed by: HOSPITALIST

## 2023-05-09 PROCEDURE — 36415 COLL VENOUS BLD VENIPUNCTURE: CPT | Performed by: HOSPITALIST

## 2023-05-09 PROCEDURE — 250N000013 HC RX MED GY IP 250 OP 250 PS 637: Performed by: NURSE PRACTITIONER

## 2023-05-09 PROCEDURE — 250N000011 HC RX IP 250 OP 636: Performed by: INTERNAL MEDICINE

## 2023-05-09 PROCEDURE — 250N000013 HC RX MED GY IP 250 OP 250 PS 637: Performed by: INTERNAL MEDICINE

## 2023-05-09 PROCEDURE — 93272 ECG/REVIEW INTERPRET ONLY: CPT | Performed by: INTERNAL MEDICINE

## 2023-05-09 PROCEDURE — 97110 THERAPEUTIC EXERCISES: CPT | Mod: GP

## 2023-05-09 PROCEDURE — 97530 THERAPEUTIC ACTIVITIES: CPT | Mod: GP

## 2023-05-09 RX ORDER — METOPROLOL TARTRATE 25 MG/1
25 TABLET, FILM COATED ORAL 2 TIMES DAILY
Qty: 60 TABLET | Refills: 1 | Status: SHIPPED | OUTPATIENT
Start: 2023-05-09

## 2023-05-09 RX ORDER — NALOXONE HYDROCHLORIDE 0.4 MG/ML
0.4 INJECTION, SOLUTION INTRAMUSCULAR; INTRAVENOUS; SUBCUTANEOUS
Status: DISCONTINUED | OUTPATIENT
Start: 2023-05-09 | End: 2023-05-09 | Stop reason: HOSPADM

## 2023-05-09 RX ORDER — NALOXONE HYDROCHLORIDE 0.4 MG/ML
0.2 INJECTION, SOLUTION INTRAMUSCULAR; INTRAVENOUS; SUBCUTANEOUS
Status: DISCONTINUED | OUTPATIENT
Start: 2023-05-09 | End: 2023-05-09 | Stop reason: HOSPADM

## 2023-05-09 RX ORDER — METOPROLOL TARTRATE 25 MG/1
25 TABLET, FILM COATED ORAL 2 TIMES DAILY
Status: DISCONTINUED | OUTPATIENT
Start: 2023-05-09 | End: 2023-05-09 | Stop reason: HOSPADM

## 2023-05-09 RX ADMIN — HEPARIN SODIUM AND DEXTROSE 750 UNITS/HR: 10000; 5 INJECTION INTRAVENOUS at 03:07

## 2023-05-09 RX ADMIN — ASPIRIN 81 MG: 81 TABLET, COATED ORAL at 09:22

## 2023-05-09 RX ADMIN — BUPROPION HYDROCHLORIDE 150 MG: 150 TABLET, FILM COATED, EXTENDED RELEASE ORAL at 09:22

## 2023-05-09 RX ADMIN — METOPROLOL TARTRATE 25 MG: 25 TABLET, FILM COATED ORAL at 09:22

## 2023-05-09 RX ADMIN — RIVAROXABAN 20 MG: 10 TABLET, FILM COATED ORAL at 15:32

## 2023-05-09 ASSESSMENT — ACTIVITIES OF DAILY LIVING (ADL)
ADLS_ACUITY_SCORE: 20

## 2023-05-09 NOTE — PLAN OF CARE
End of Shift Summary [id number]:     Neuro: PERRLA.  Moves all extremities.  Follows commands, coherent  Behavior:  A/Ox4, calm, pleasant, cooperative  Cardiovascular: NSR, Hemodynamically stable  Pulmonary:  Stable SPO2s on RA  GI: regular diet   :  Adequate UOP  Skin:  Intact except some ecchymoses at right radial site, resolving, CMS intact  IV/Drips:  L PIV g#18, low intensity Heparin at 750u/hr  Pain: denies recurrence of chest pain at rest or w/ activities  Plan: Continue heparin gtt ~48-h post cath before resuming anticoagulation, Anticipate at least another 1-2 days of inpatient monitoring.    ROUTINE IP LABS (Last four results)  BMPRecent Labs   Lab 05/08/23  0737 05/07/23  1215 05/06/23  2347     --  140   POTASSIUM 3.8 3.8 3.0*   CHLORIDE 107  --  105   DENNY 8.6*  --  7.9*   CO2 25  --  24   BUN 9.9  --  16.1   CR 0.75  --  0.44*   *  --  128*     CBC  Recent Labs   Lab 05/08/23  0737 05/06/23  2349 05/06/23  2347   WBC 5.9 5.3 5.4   RBC 4.25 3.74* 4.04   HGB 13.3 12.0 12.7   HCT 40.1 36.0 38.9   MCV 94 96 96   MCH 31.3 32.1 31.4   MCHC 33.2 33.3 32.6   RDW 13.2 13.0 12.9    201 210        Latest Reference Range & Units 05/08/23 00:19 05/08/23 07:37 05/08/23 17:00   PTT 22 - 38 Seconds 72 (H) 91 (H) 49 (H)       Intake/Output Summary (Last 24 hours) at 5/8/2023 2225  Last data filed at 5/8/2023 2000  Gross per 24 hour   Intake 120 ml   Output --   Net 120 ml       Goal Outcome Evaluation: in progress  Plan of care reviewed with:  patient

## 2023-05-09 NOTE — PROGRESS NOTES
Patient's insurance out of network for [facility].  Writer arranged follow up and sent referrals to [facility].    Follow up with primary care provider within 7 days for hospital follow- up.     The following labs/tests are recommended: cbc.     FOLLOW UP ARRANGED WITH TA MENENDEZ ON Wednesday, MAY 17TH  AT 2 PM AT:     [place] [id number] [facility]   [phone]     Follow with cardiology:  FOLLOW UP ARRANGED WITH DR. DUNLAP ON Monday, June 12TH WITH CHECK IN AT 12:40 PM AND APPOINTMENT AT 1PM at:     [facility]   [id number] [place] [id number]   [phone]     [facility] (Vascular Medicine)  will contact patient regarding follow up with Vascular Medicine: [phone]     Becky Johnson RN  Care Coordinator  [facility]  [phone] (text or call)

## 2023-05-09 NOTE — DISCHARGE SUMMARY
[facility]  Hospitalist Discharge Summary      Date of Admission:  5/6/2023  Date of Discharge:  5/9/2023  Discharging Provider: Daxa Garland MD  Discharge Service: Hospitalist Service    Discharge Diagnoses         Follow-ups Needed After Discharge   Follow-up Appointments     Follow-up and recommended labs and tests         ---------------------------------------------------------------------------  ----  Follow up with primary care provider, Marge Rivas, within 7 days   for hospital follow- up.    The following labs/tests are recommended: cbc .      Follow with cardiology:  FOLLOW UP ARRANGED WITH DR. DUNLAP ON Monday, June 12TH WITH CHECK IN AT 12:40 PM AND APPOINTMENT AT 1PM at:    [address] [address]  [phone]    [facility] (Vascular Medicine)  will contact you   regarding follow up with Vascular Medicine:    [phone]         {Additional follow-up instructions/to-do's for PCP        Unresulted Labs Ordered in the Past 30 Days of this Admission     No orders found from 4/6/2023 to 5/7/2023.      These results will be followed up by     Discharge Disposition   Discharged to home  Condition at discharge: Stable        Hospital Course     This is a 72-year-old female with no past medical history of any coronary artery disease and was diagnosed with PE on 4/6 and is on Xarelto and had negative hypercoagulable work-up at that time presented with chest pain which was radiating to the back and her EKG was concerning for ST elevation in V4 and V6 and her pain improved and resolved with nitroglycerin and CTA chest was ordered last night which showed small pulmonary embolism in the right lower lobe had resolved and no new pulmonary emboli and no acute process identified in the chest abdomen or pelvis and she was started on heparin drip and cardiology was consulted.    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  The dissection is in the distal portion of the vessel, and the vessel is very tortuous, no coronary atherosclerosis appreciated, LVEDP is mildly elevated, 17mmHg, A run of sustained monomorphic ventricular tachycardia was noted during the procedure which resolved without intervention and was not associated with symptoms or hemodynamic compromise and  Lat 1st Mrg lesion is 90% stenosed. She was started on asa 81 mg for one month and metoprolol 25 mg bid . She had echo done on 5/8 - ef is 55-60%, LV function is normal , mild lvh, no valve disease , no pericardial effusion, Mid inferolateral to apical lateral severe hypokinesis . Patient was kept on heparin drip and was given xeralto on day of discharge. She was pain free and was in agreement in going home and will come to er if she develops chest pain     She will follow as outpatient with pcp , cardiology and vascular medicine referral was placed and sw/csm team involved in discontinue planning     Consultations This Hospital Stay   PHARMACY IP CONSULT  CARDIOLOGY IP CONSULT  CARDIOLOGY IP CONSULT  PHARMACY IP CONSULT  PHARMACY IP CONSULT  PHARMACY IP CONSULT  PHARMACY IP CONSULT  CARDIAC REHAB IP CONSULT  SMOKING CESSATION PROGRAM IP CONSULT    Code Status   Full Code    Time Spent on this Encounter   I, Daxa Garland MD, personally saw the patient today and spent greater than 30 minutes discharging this patient.       Daxa Garland MD  [facility] HEART [facility]ARE  [address] [place] [address], [address]  [facility] [address]  Phone: [phone]  ______________________________________________________________________    Physical Exam   Vital Signs: Temp: 98.3  F (36.8  C) Temp src: Oral BP: 112/74 Pulse: 71   Resp: 18 SpO2: 96 % O2 Device: None (Room air)    Weight: 138 lbs 14.4 oz    General: AOx3  Neck: Neck is supple   Respiratory: on room air and speak in  full sentences   Cardiovascular: Regular rate , S1 and S2 normal with no murmer or rubs or gallops  Abdomen:   soft , non tender   Neurologic: No facial droop  Musculoskeletal: Normal Range of motion over upper and lower extremities bilaterally   Psychiatric: cooperative           Primary Care Physician   Marge Rivas    Discharge Orders      CARDIAC REHAB REFERRAL      Follow-Up with Cardiology BLAYNE      Vascular Medicine Referral      Medication Instructions - Anticoagulants    Do NOT stop your aspirin or platelet inhibitor unless directed by your Cardiologist.  These medications help to prevent platelets in your blood from sticking together and forming a clot.  Examples of these medications are:  Ticagrelor (Brilinta), Clopidigrel (Plavix), Prasugrel (Effient)     When to call - Contact the Heart Clinic    You may experience symptoms that require follow-up before your scheduled appointment. Contact the Heart Clinic if you develop: Fever over 100.4o Fahrenheit, that lasts more than one day; Redness, heat, or pus at the puncture site; Change in color or temperature in your hand or arm.     When to call - Reasons to Call 911    If your wrist puncture site starts bleeding after discharge, sit down and apply firm pressure with your thumb against the puncture site and fingers against the back of the wrist for 10 minutes. If the bleeding stops, continue to rest, keeping your wrist still for 2 hours. Notify your doctor as soon as possible.  IF BLEEDING DOES NOT STOP OR THERE IS A LARGER AMOUNT OF BLEEDING OR SPURTING CALL 9-1-1 immediately.DO NOT drive yourself to the hospital.     Precautions - Lifting    DO NOT lift more than 5 pounds with affected arm for 48 hours     Precautions - Household Activities    Avoid excessive bending or movement of your wrist for 72 hours.  Do not subject hand/arm to any forceful movements for 24 hours, such as supporting weight when rising from a chair or bed.     Remove the  band-aid on the puncture site after 24 hours and leave open to air. If minor oozing, you may apply a band-aid and remove after 12 hours.     Precautions - Active Sports Activities    DO NOT engage in vigorous exercise using your affected arm for 3 days after discharge.  This includes golf, tennis or swimming.     Precautions - Operating yard equipment or vehicles    Do not operate a chainsaw, lawnmower, motorcycle, or all-terrain vehicle for 48 hours after the procedure.     Precautions - Elective Dental Work    NO elective dental work for 6 weeks after receiving a stent.     Comfort and Pain Management - Bruising after Surgery    Expect mild tingling of hand and tenderness at the wrist puncture site for up to 3 days. You may take Tylenol or a pain medicine recommended by your doctor.     Activity - Cardiac Rehab    You are encouraged to enroll in an Outpatient Cardiac Rehab program after discharge from the hospital.  Our Cardiac Rehab staff may visit briefly with you while you're in the hospital.  If they miss you, someone will contact you after you are home.     Return to Driving    Driving is NOT permitted for 24 hours after surgery     Return to work    You may return to work after 72 hours if you are feeling well and your job does not involve heavy lifting.     Shower / Bathing    You may shower on the day after your procedure.  DO NOT soak of wrist with the puncture site in water for 3 days to prevent infection. DO NOT take a tub bath or wash dishes for 3 days after the procedure     Dressing Removal    Remove the band-aid on the puncture site after 24 hours and leave open to air. If minor oozing, you may apply a band-aid and remove after 12 hours     Reason for your hospital stay    Spontaneous coronary artery dissection     Follow-up and recommended labs and tests     -------------------------------------------------------------------------------  Follow up with primary care provider, Marge Rivas  within 7 days for hospital follow- up.    The following labs/tests are recommended: cbc .      Follow with cardiology:  FOLLOW UP ARRANGED WITH DR. DUNLAP ON Monday, June 12TH WITH CHECK IN AT 12:40 PM AND APPOINTMENT AT 1PM at:    [facility]  [address] [place] [address]  [phone]    [facility] (Vascular Medicine)  will contact you regarding follow up with Vascular Medicine:    [phone]     Activity    Your activity upon discharge: activity as tolerated     Discharge Instructions    If you develop chest pain again please call 911 and go to the nearest ER     Cardiac Event Monitor Adult Pediatric     Diet    Follow this diet upon discharge: Orders Placed This Encounter      Regular Diet Adult       Significant Results and Procedures   Most Recent 3 CBC's:Recent Labs   Lab Test 05/08/23  0737 05/06/23 2349 05/06/23  2347   WBC 5.9 5.3 5.4   HGB 13.3 12.0 12.7   MCV 94 96 96    201 210     Most Recent 3 BMP's:Recent Labs   Lab Test 05/09/23  0534 05/08/23  0737 05/07/23  1215 05/06/23  2347    139  --  140   POTASSIUM 3.9 3.8 3.8 3.0*   CHLORIDE 106 107  --  105   CO2 26 25  --  24   BUN 14.4 9.9  --  16.1   CR 0.73 0.75  --  0.44*   ANIONGAP 8 7  --  11   DENNY 8.5* 8.6*  --  7.9*   GLC 95 101*  --  128*     Most Recent 2 LFT's:Recent Labs   Lab Test 05/06/23 2347 09/02/22  1148   AST 23 24   ALT 16 18   ALKPHOS 81 86   BILITOTAL <0.2 0.3     Most Recent 3 INR's:No lab results found.,   Results for orders placed or performed during the hospital encounter of 05/06/23   CT Chest (PE) Abdomen Pelvis w Contrast    Narrative    EXAM: CT CHEST PE ABDOMEN PELVIS W CONTRAST  LOCATION: [facility]  DATE/TIME: 5/7/2023 12:09 AM CDT    INDICATION: Recent diagnosis of pulmonary embolism. Severe chest, back, and axillary pain.  COMPARISON: CTA chest 04/06/2023. CT abdomen and pelvis 09/02/2022.  TECHNIQUE: CT chest pulmonary angiogram and  routine CT abdomen pelvis with IV contrast. Arterial phase through the chest and venous phase through the abdomen and pelvis. Multiplanar reformats and MIP reconstructions were performed. Dose reduction   techniques were used.   CONTRAST:  74 ml Isovue 370    FINDINGS:  ANGIOGRAM CHEST: Small pulmonary embolus within the superior segment of the right lower lobe on 04/06/2023 no longer visualized. No new pulmonary emboli detected. No aortic aneurysm or dissection.    LUNGS AND PLEURA: Small benign calcified granuloma in the right upper lobe. Small polygonal nodule along the right horizontal fissure consistent with benign lymphoid tissue. Bibasilar atelectasis.    MEDIASTINUM/AXILLAE: Small hiatal hernia. Small calcified mediastinal lymph nodes related to benign granulomatous disease.    CORONARY ARTERY CALCIFICATION: None.    HEPATOBILIARY: Normal.    PANCREAS: Normal.    SPLEEN: Normal.    ADRENAL GLANDS: Normal.    KIDNEYS/BLADDER: Small benign cyst of the right kidney does not warrant follow-up. 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    BOWEL: Mild colonic diverticulosis. No bowel obstruction or inflammation.    LYMPH NODES: Normal.    VASCULATURE: Unremarkable.    PELVIC ORGANS: Fibroid uterus. Minimal free pelvic fluid.    MUSCULOSKELETAL: Plate and screw fixation of the right clavicle. Degenerative changes of the spine.      Impression    IMPRESSION:  1.  Small pulmonary embolus in the right lower lobe on 04/06/2023 has resolved. No new pulmonary emboli.  2.  No other acute process identified in the chest, abdomen or pelvis.   Abdomen US, limited (RUQ only)    Narrative    EXAM: US ABDOMEN LIMITED  LOCATION: [facility]  DATE/TIME: 5/7/2023 2:41 AM CDT    INDICATION: Abdominal pain.  COMPARISON: CT abdomen and pelvis earlier today.  TECHNIQUE: Limited abdominal ultrasound.    FINDINGS:    GALLBLADDER: Normal. No gallstones, wall thickening, or pericholecystic fluid. Negative  sonographic Catalan's sign.    BILE DUCTS: No biliary dilatation. The common duct measures 3 mm.    LIVER: Normal parenchyma with smooth contour. No focal mass.    RIGHT KIDNEY: 13 mm benign cyst containing some debris. No hydronephrosis.    PANCREAS: The visualized portions are normal.    No ascites.      Impression    IMPRESSION:  No significant abnormality.       Echocardiogram Complete     Value    LVEF  55-60%    [facility]    [id number]  [id number]  [id number]  [id number]^JOHNNY^MAGALIS^JOSE     [facility]  Echocardiography Laboratory  [address] [place] [id number]     Name: PAYAL MOYA  MRN: [id number]  : 1950  Study Date: 2023 08:42 AM  Age: 72 yrs  Gender: Female  Patient Location: [facility]  Reason For Study: Acute Myocardial Infarction  Ordering Physician: MAGALIS TURNER  Referring Physician: MAGALIS TURNER  Performed By: Magnolia Montesinos     BSA: 1.8 m2  Height: 67 in  Weight: 148 lb  HR: 77  BP: 121/73 mmHg  ______________________________________________________________________________  Procedure  Complete Echo Adult. Optison (NDC #[id number]) given intravenously.  ______________________________________________________________________________  Interpretation Summary     The visual ejection fraction is 55-60%.  Left ventricular systolic function is normal.  There is mild concentric left ventricular hypertrophy.  Mid inferolateral to apical lateral severe hypokinesis.  The right ventricle is normal in structure, function and size.  No significant valve dysfunction.  The inferior vena cava was normal in size with preserved respiratory  variability.  There is no pericardial effusion.     No prior study for comparison.  ______________________________________________________________________________  Left Ventricle  The left ventricle is normal in size. There is mild concentric left  ventricular hypertrophy. The visual ejection fraction is 55-60%.  Left  ventricular diastolic function is indeterminate. Left ventricular systolic  function is normal. Mid inferolateral to apical lateral severe hypokinesis.     Right Ventricle  The right ventricle is normal in structure, function and size.     Atria  Normal left atrial size. Right atrial size is normal.     Mitral Valve  There is mild mitral annular calcification. There is trace mitral  regurgitation.     Tricuspid Valve  The tricuspid valve is normal in structure and function. The right ventricular  systolic pressure is approximated at 22.7 mmHg plus the right atrial pressure.     Aortic Valve  The aortic valve is trileaflet with aortic valve sclerosis. There is mild (1+)  aortic regurgitation.     Pulmonic Valve  The pulmonic valve is normal in structure and function. There is mild (1+)  pulmonic valvular regurgitation.     Vessels  The aortic root is normal size. Normal size ascending aorta. The inferior vena  cava was normal in size with preserved respiratory variability.     Pericardium  There is no pericardial effusion.     ______________________________________________________________________________  MMode/2D Measurements & Calculations  IVSd: 0.90 cm  LVIDd: 4.5 cm  LVIDs: 2.9 cm  LVPWd: 1.0 cm  FS: 35.6 %  LV mass(C)d: 142.9 grams  LV mass(C)dI: 80.3 grams/m2     Ao root diam: 3.4 cm  LA dimension: 3.2 cm  asc Aorta Diam: 3.0 cm  LA/Ao: 0.94  LVOT diam: 1.9 cm  LVOT area: 2.8 cm2  LA Volume (BP): 24.0 ml  LA Volume Index (BP): 13.5 ml/m2  RWT: 0.44  TAPSE: 2.4 cm     Doppler Measurements & Calculations  MV E max burt: 69.2 cm/sec  MV A max burt: 85.4 cm/sec  MV E/A: 0.81     MV dec time: 0.18 sec  LV V1 max P.5 mmHg  LV V1 max: 106.0 cm/sec  LV V1 VTI: 21.7 cm  SV(LVOT): 61.5 ml  SI(LVOT): 34.6 ml/m2  PA acc time: 0.15 sec  TR max burt: 238.0 cm/sec  TR max P.7 mmHg  E/E' av.0  Lateral E/e': 8.8  Medial E/e': 9.2  RV S Burt: 17.1 cm/sec      ______________________________________________________________________________  Report approved by: Rachel Ware 05/08/2023 11:42 AM         Cardiac Catheterization    Addendum: 5/7/2023         Lat 1st Mrg lesion is 90% stenosed.     Left ventricular filling pressures are mildly elevated.    1. 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  The dissection is in the distal portion of the vessel, and the vessel is very tortuous.  2. There is no coronary atherosclerosis appreciated.   3. LVEDP is mildly elevated, 17mmHg.   A run of sustained monomorphic ventricular tachycardia was noted during the procedure which resolved without intervention and was not associated with symptoms or hemodynamic compromise.       Narrative       Lat 1st Mrg lesion is 90% stenosed.     Left ventricular filling pressures are mildly elevated.    Spontaneous dissection of the first obtuse marginal branch of the left   circumflex.  The dissection is in the distal portion of the vessel, and   the vessel is very tortuous.  There is no coronary atherosclerosis appreciated.   LVEDP is mildly elevated, 17mmHg.   A run of sustained monomorphic ventricular tachycardia was noted during   the procedure which resolved without intervention and was not associated   with symptoms or hemodynamic compromise.            Discharge Medications   Current Discharge Medication List      START taking these medications    Details   aspirin (ASA) 81 MG EC tablet Take 1 tablet (81 mg) by mouth daily  Qty: 29 tablet, Refills: 0    Associated Diagnoses: Spontaneous dissection of coronary artery      metoprolol tartrate (LOPRESSOR) 25 MG tablet Take 1 tablet (25 mg) by mouth 2 times daily  Qty: 60 tablet, Refills: 1    Associated Diagnoses: Spontaneous dissection of coronary artery         CONTINUE these medications which have CHANGED    Details   rivaroxaban ANTICOAGULANT (XARELTO) 20 MG TABS tablet Take 1 tablet (20  mg) by mouth daily (with dinner)         CONTINUE these medications which have NOT CHANGED    Details   buPROPion (WELLBUTRIN XL) 150 MG 24 hr tablet Take 150 mg by mouth every morning           Allergies   Allergies   Allergen Reactions     Codeine Nausea and Vomiting     PN: LW Reaction: Vomiting  PN: LW Reaction: Vomiting       Ampicillin Rash     PN: LW Reaction: Rash, Generalized  PN: LW Reaction: Rash, Generalized       Cefaclor Rash     Has tolerated in the past 09/2022       Penicillin G Rash     Sulfa Antibiotics Unknown and Rash     PN: LW Reaction: Unknown Reaction       Sulfatolamide Rash

## 2023-05-09 NOTE — PROGRESS NOTES
"   05/09/23 0852   Appointment Info   Signing Clinician's Name / Credentials (PT) Cleo Hilton, PT, DPT   Rehab Comments (PT) Cardiac Rehab   Living Environment   People in Home spouse   Current Living Arrangements house   Home Accessibility stairs to enter home;stairs within home   Number of Stairs, Main Entrance 2   Number of Stairs, Within Home, Primary greater than 10 stairs   Stair Railings, Within Home, Primary railings on both sides of stairs   Transportation Anticipated family or friend will provide   Living Environment Comments Pt lives in [facility].   Self-Care   Usual Activity Tolerance excellent   Current Activity Tolerance good   Regular Exercise Yes   Activity/Exercise Type walking   Exercise Amount/Frequency daily;45 mins   Equipment Currently Used at Home none   Fall history within last six months no   Activity/Exercise/Self-Care Comment Pt ind with ADLs and IADLs at baseline. Pt walks daily.   General Information   Onset of Illness/Injury or Date of Surgery 05/06/23   Referring Physician Jacquelin Roth APRN CNP   Patient/Family Therapy Goals Statement (PT) Planning on going home when able. Open to CR.   Pertinent History of Current Problem (include personal factors and/or comorbidities that impact the POC) Pt is a 72 year old female admitted on 5/6/2023. She has no known prior CAD or other traditional documented cardiac risk factors.  She was dx on 4/6 with PE with negative hypercoag workup and was treated on xarelto with plans to discontinue after international trip.  She presents with STEMI that responded to NTG with resolution but with significant troponin elevation. Pt underwent angiogram on 5/7, found to have SCAD, no intervention completed.   Existing Precautions/Restrictions cardiac;lifting  (\"To reduce arterial shear stress, target exercise heart rate is recommended at 50 to 70 percent of heart rate reserve, and systolic blood pressure during exercise is limited to <130 " "mmHg.  Avoid lifting weights >20 to 30 pounds\")   Heart Disease Risk Factors Medical history   Cognition   Affect/Mental Status (Cognition) WFL   Orientation Status (Cognition) oriented x 4   Follows Commands (Cognition) WFL   Pain Assessment   Patient Currently in Pain No   Posture    Posture Not impaired   Range of Motion (ROM)   Range of Motion ROM is WFL   Strength (Manual Muscle Testing)   Strength (Manual Muscle Testing) Able to perform R SLR;Able to perform L SLR   Strength Comments Grossly antigravity strength BUE and LE with functional transfers   Bed Mobility   Comment, (Bed Mobility) ind   Transfers   Comment, (Transfers) ind.   Gait/Stairs (Locomotion)   Comment, (Gait/Stairs) Amb with no AD, ind. no LOB.   Balance   Balance no deficits were identified   Sensory Examination   Sensory Perception patient reports no sensory changes   Clinical Impression   Criteria for Skilled Therapeutic Intervention Yes, treatment indicated   PT Diagnosis (PT) Impaired activity tolerance   Influenced by the following impairments fatigue   Functional limitations due to impairments impaired functional independence   Clinical Presentation (PT Evaluation Complexity) Stable/Uncomplicated   Clinical Presentation Rationale per MR and clinical judgement   Clinical Decision Making (Complexity) low complexity   Planned Therapy Interventions (PT) progressive activity/exercise;risk factor education;home program guidelines;stair training;patient/family education   Risk & Benefits of therapy have been explained evaluation/treatment results reviewed;care plan/treatment goals reviewed;risks/benefits reviewed;current/potential barriers reviewed;participants voiced agreement with care plan;participants included;patient   PT Total Evaluation Time   PT Eval, Low Complexity Minutes (20735) 5   Physical Therapy Goals   PT Frequency 2x/day   PT Predicted Duration/Target Date for Goal Attainment 05/14/23   PT Goals Cardiac Phase 1   PT: " Understanding of cardiac education to maximize quality of life, condition management, and health outcomes Patient;Verbalize;Demonstrate   PT: Perform aerobic activity with stable cardiovascular response continuous;10 minutes;treadmill   PT: Functional/aerobic ambulation tolerance with stable cardiovascular response in order to return to home and community environment Independent;Greater than 300 feet   PT: Navigation of stairs simulating home set up with stable cardiovascular response in order to return to home and community environment Independent;8 stairs;Rail on right   PT Discharge Planning   PT Plan treadmill ambulation, bring cardiac edu handouts.   PT Discharge Recommendation (DC Rec) home with assist;home with outpatient cardiac rehab   PT Rationale for DC Rec Pt tolerated session well. Pt ind with mobility. Anticipate when medically ready, pt may discharge to home with assist from spouse as needed, recommend pt continue with OP CRII.   PT Brief overview of current status ind.   Total Session Time   Total Session Time (sum of timed and untimed services) 5

## 2023-05-09 NOTE — PLAN OF CARE
Cardiac Rehab Discharge Summary    Reason for therapy discharge:    Discharged to home with outpatient therapy.    Progress towards therapy goal(s). See goals on Care Plan in [facility] electronic health record for goal details.  Goals partially met.  Barriers to achieving goals:   discharge from facility.    Therapy recommendation(s):    Continued therapy is recommended.  Rationale/Recommendations:  Continue home exercise program.  Pt tolerated session well. Pt ind with mobility. Anticipate when medically ready, pt may discharge to home with assist from spouse as needed, recommend pt continue with OP CRII.     ambulatory

## 2023-05-09 NOTE — PLAN OF CARE
Pt discharged to home with spouse. Discharge instructions given to pt & spouse, questions answered, no new concerns reported. IV removed without incident. No discharge medications ordered. Pt escorted to family vehicle at door six via wheelchair and hospitals staff.

## 2023-05-09 NOTE — PROGRESS NOTES
[facility]    Non billing     Medicine Progress Note - Hospitalist Service    Date of Admission:  5/6/2023    Assessment & Plan     This is a 72-year-old female with no past medical history of any coronary artery disease and was diagnosed with PE on 4/6 and is on Xarelto and had negative hypercoagulable work-up at that time presented with chest pain which was radiating to the back and her EKG was concerning for ST elevation in V4 and V6 and her pain improved and resolved with nitroglycerin and CTA chest was ordered last night which showed small pulmonary embolism in the right lower lobe had resolved and no new pulmonary emboli and no acute process identified in the chest abdomen or pelvis and she was started on heparin drip and cardiology was consulted    Chest pain with STEMI S/P LHC on 5/7/2023  SCAD  -Presented with chest pain which had atypical features and her initial EKG showedelevation V4-V6, subsequently improved with NTG  -CTA chest did not show any evidence of new pulmonary emboli  - LHC on 5/7/2023 showed Spontaneous dissection of the first obtuse marginal branch of the left circumflex.  The dissection is in the distal portion of the vessel, and the vessel is very tortuous, no coronary atherosclerosis appreciated, LVEDP is mildly elevated, 17mmHg, A run of sustained monomorphic ventricular tachycardia was noted during the procedure which resolved without intervention and was not associated with symptoms or hemodynamic compromise and  Lat 1st Mrg lesion is 90% stenosed.  -Continue patient with 81 mg aspirin daily for 1 month, then stop, inpatient monitoring for about 3 days and heparin drip for now per Iintervention cardiology and no need for statin  - Echo ON 5/8 - ef is 55-60%, LV function is normal , mild lvh, no valve disease , no pericardial effusion, Mid inferolateral to apical lateral severe hypokinesis.  -Cardiology increased metoprolol to 25 mg TID   -will need outpatient  vascular medicine consult   - per cardiology --Outpatient cardiac rehabilitation. To reduce arterial shear stress, target exercise heart rate is recommended at 50 to 70 percent of heart rate reserve, and systolic blood pressure during exercise is limited to <130 mmHg. Exercise is adjusted to upper heart rate target to achieve rating of perceived exertion of moderate. Women are instructed to avoid lifting weights >20 to 30 pounds   -remains chest pain free   -spoke with cards and she can go later in day and we will give xeralto in evening today       Hypokalemia-resolved     Recent small right lower lobe unprovoked PE on 4/6/2023  -She is currently on Xarelto at home and CTA chest showed that unprovoked PE has resolved and we will continue the patient with heparin drip and hold Xarelto and she will need to follow-up with hematology as outpatient    Mild hypocalcemia   - we will order ionized calcium and if low then order replacement  oral calcium    Depression/anxiety  -We will continue with PTA  Wellbutrin       Diet: Regular Diet Adult    DVT Prophylaxis: heparin drip   Horton Catheter: Not present  Lines: None     Cardiac Monitoring: ACTIVE order. Indication: Post- PCI/Angiogram (24 hours)  Code Status: Full Code      Clinically Significant Risk Factors                                 Disposition Plan      Expected Discharge Date: 05/09/2023                  Daxa Garland MD  Hospitalist Service  [facility]  Securely message with AssertID (more info)  Text page via [employer] Paging/Directory   ______________________________________________________________________    Interval History     No chest pain or sob  echo results conveyed yesterday  No nausea or vomiting   Felt tired after cardiac rehab    Discussed with RN, care coordinator team, cardiology    Physical Exam   Vital Signs: Temp: 97.9  F (36.6  C) Temp src: Oral BP: 104/67 Pulse: 75   Resp: 18 SpO2: 96 % O2 Device: None (Room air)     Weight: 138 lbs 14.4 oz        General: AOx3  Neck: Neck is supple   Respiratory: on room air and speak in full sentences   Cardiovascular: Regular rate , S1 and S2 normal with no murmer or rubs or gallops  Abdomen:   soft , non tender   Neurologic: No facial droop  Musculoskeletal: Normal Range of motion over upper and lower extremities bilaterally   Psychiatric: cooperative     Medical Decision Making             Data     I have personally reviewed the following data over the past 24 hrs:    N/A  \   N/A   / N/A     140 106 14.4 /  95   3.9 26 0.73 \       INR:  N/A PTT:  67 (H)   D-dimer:  N/A Fibrinogen:  N/A       Imaging results reviewed over the past 24 hrs:   Recent Results (from the past 24 hour(s))   Echocardiogram Complete   Result Value    LVEF  55-60%    Narrative    [id number]  [id number]  [id number]  [id number]^JOHNNY^MAGALIS^JOSE     [facility]  Echocardiography Laboratory  [address] [place] [address]     Name: PAYAL MOYA  MRN: [id number]  : 1950  Study Date: 2023 08:42 AM  Age: 72 yrs  Gender: Female  Patient Location: [facility]  Reason For Study: Acute Myocardial Infarction  Ordering Physician: MAGALIS TURNER  Referring Physician: MAGALIS UTRNER  Performed By: Magnolia Montesinos     BSA: 1.8 m2  Height: 67 in  Weight: 148 lb  HR: 77  BP: 121/73 mmHg  ______________________________________________________________________________  Procedure  Complete Echo Adult. Optison (NDC #[id number]) given intravenously.  ______________________________________________________________________________  Interpretation Summary     The visual ejection fraction is 55-60%.  Left ventricular systolic function is normal.  There is mild concentric left ventricular hypertrophy.  Mid inferolateral to apical lateral severe hypokinesis.  The right ventricle is normal in structure, function and size.  No significant valve dysfunction.  The inferior vena cava was normal in  size with preserved respiratory  variability.  There is no pericardial effusion.     No prior study for comparison.  ______________________________________________________________________________  Left Ventricle  The left ventricle is normal in size. There is mild concentric left  ventricular hypertrophy. The visual ejection fraction is 55-60%. Left  ventricular diastolic function is indeterminate. Left ventricular systolic  function is normal. Mid inferolateral to apical lateral severe hypokinesis.     Right Ventricle  The right ventricle is normal in structure, function and size.     Atria  Normal left atrial size. Right atrial size is normal.     Mitral Valve  There is mild mitral annular calcification. There is trace mitral  regurgitation.     Tricuspid Valve  The tricuspid valve is normal in structure and function. The right ventricular  systolic pressure is approximated at 22.7 mmHg plus the right atrial pressure.     Aortic Valve  The aortic valve is trileaflet with aortic valve sclerosis. There is mild (1+)  aortic regurgitation.     Pulmonic Valve  The pulmonic valve is normal in structure and function. There is mild (1+)  pulmonic valvular regurgitation.     Vessels  The aortic root is normal size. Normal size ascending aorta. The inferior vena  cava was normal in size with preserved respiratory variability.     Pericardium  There is no pericardial effusion.     ______________________________________________________________________________  MMode/2D Measurements & Calculations  IVSd: 0.90 cm  LVIDd: 4.5 cm  LVIDs: 2.9 cm  LVPWd: 1.0 cm  FS: 35.6 %  LV mass(C)d: 142.9 grams  LV mass(C)dI: 80.3 grams/m2     Ao root diam: 3.4 cm  LA dimension: 3.2 cm  asc Aorta Diam: 3.0 cm  LA/Ao: 0.94  LVOT diam: 1.9 cm  LVOT area: 2.8 cm2  LA Volume (BP): 24.0 ml  LA Volume Index (BP): 13.5 ml/m2  RWT: 0.44  TAPSE: 2.4 cm     Doppler Measurements & Calculations  MV E max ian: 69.2 cm/sec  MV A max ian: 85.4 cm/sec  MV E/A:  0.81     MV dec time: 0.18 sec  LV V1 max P.5 mmHg  LV V1 max: 106.0 cm/sec  LV V1 VTI: 21.7 cm  SV(LVOT): 61.5 ml  SI(LVOT): 34.6 ml/m2  PA acc time: 0.15 sec  TR max burt: 238.0 cm/sec  TR max P.7 mmHg  E/E' av.0  Lateral E/e': 8.8  Medial E/e': 9.2  RV S Burt: 17.1 cm/sec     ______________________________________________________________________________  Report approved by: Rachel Ware 2023 11:42 AM

## 2023-05-09 NOTE — PLAN OF CARE
Patient  alert and oriented. VSS on RA. Tele: SR w/ PVC. She denies any pain. Continues on Heparin drip. Next PTT recheck tomorrow at 0600. Voinding adequately. Up with SBA. Plan is to transition to PO anticoagulant

## 2023-05-09 NOTE — PROGRESS NOTES
[facility]  Cardiology Progress Note  Date of Service: 05/09/2023  Date of Admission: 5/6/2023  Inpatient Cardiologist: Dr. Siddiqui    Summary    This is a 72-year-old female with no past medical history of any coronary artery disease and was diagnosed with PE on 4/6 and is on Xarelto and had negative hypercoagulable work-up at that time presented with chest pain which was radiating to the back and her EKG was concerning for ST elevation in V4 and V6 and her pain improved and resolved with nitroglycerin and CTA chest was ordered last night which showed small pulmonary embolism in the right lower lobe had resolved and no new pulmonary emboli and no acute process identified in the chest abdomen or pelvis and she was started on heparin drip and cardiology was consulted.    Interval May 9, 2023    Patient is feeling well this morning.  She has no acute cardiac complaints.  She was able to tolerate cardiac rehab.  RRA site remains stable without numbness, tingling or concerns for bleeding.  She has absolutely no symptoms, and remains free from CP, LH or dizziness, SOB, ZAPATA, palpitations.  No recurrent VT.    Asssessment:    Chest pain with STEMI   S/p LHC on 5/7/2023  SCAD  -No current symptoms, CP free.  -Initial EKG showed elevation V4-V6, subsequently improved with NTG  -LHC on 5/7/2023: spontaneous dissection of OM1 branch of the left circumflex.  The dissection is in the distal portion of the vessel, and the vessel is very tortuous, no coronary atherosclerosis appreciated, LVEDP is mildly elevated, 17 mmHg.  Lat 1st Mrg lesion is 90% stenosed.  -TTE 5/7/23 with EF 55-60% with mid inferolateral to apical lateral severe hypokinesis.  -Run of sustained monomorphic VT was noted during procedure; No recurrence.  -On ASA 81 mg, lopressor 25 mg BID.    Recent small right lower lobe unprovoked PE on 4/6/2023  -Xarelto on hold in current setting.  On Heparin gtt.  -CTA chest showed that unprovoked  PE has resolved     Depression/anxiety, on PTA Wellbutrin  _____________________________________________________________    Plan:     Stop Heparin gtt and resume Xarelto.    Continue ASA 81 mg for 1 month; No statin indicated.    Continue other cardiac medications with special consideration for HR goal 50-70 BMP and SBP <130 to reduce arterial wall stress in setting of SCAD.    No lifting greater than 20#.    Establish with outpatient cardiac rehab.    Outpatient follow up and evaluation for fibromuscular dysplasia and consideration for screening for genetic connective tissue disorder recommended.    Follow up in cardiology clinic on 6/16 as scheduled.    Ok to discharge to home later today.  _____________________________________________________________    Discharge Disposition:  Today.    Thank you for the opportunity to participate in the care of Ms. Brii Cook.  Please feel free to reach out with any additional questions.    JF Gold, CNP   Nurse Practitioner  [facility]  Pager: [phone]  Phone: [phone]  Text Page (8am - 5pm, M-F)    Physical Exam :  Temp: (P) 98.3  F (36.8  C) Temp src: (P) Oral BP: 116/68 Pulse: 72   Resp: 18 SpO2: 96 % O2 Device: None (Room air)      Vitals:    05/06/23 2350 05/08/23 0628 05/09/23 0550   Weight: 67.3 kg (148 lb 5.9 oz) 62.6 kg (138 lb) 63 kg (138 lb 14.4 oz)     I/O last 3 completed shifts:  In: 120 [P.O.:120]  Out: -     Constitutional: Appears stated age, well nourished, NAD.  Eyes: Pupils equal, round. Sclerae anicteric.   HEENT: Normocephalic, atraumatic.   Neck: Supple. Carotid pulses full and equal. No JVD appreciated.  Respiratory: Non-labored. Lungs CTAB.  Cardiovascular: RRR, normal S1 and S2. No M/G/R.  Vascular: Peripheral pulses intact and symmetric bilaterally; RRA site stable.  GI: Soft, non-distended, non-tender, bowel sounds present equally.  Skin: Warm and dry. No rashes, cyanosis,  edema.  Musculoskeletal/Extremities: Symmetrical movement. No edema.  Neurologic: No gross focal deficits. Alert, awake, and oriented to all spheres.  Psychiatric: Affect appropriate. Mentation normal.    Data   Recent Labs   Lab 05/09/23 0534 05/08/23 0737 05/07/23  1215 05/06/23  2349 05/06/23  2347   WBC  --  5.9  --  5.3 5.4   HGB  --  13.3  --  12.0 12.7   MCV  --  94  --  96 96   PLT  --  184  --  201 210    139  --   --  140   POTASSIUM 3.9 3.8 3.8  --  3.0*   CHLORIDE 106 107  --   --  105   CO2 26 25  --   --  24   BUN 14.4 9.9  --   --  16.1   CR 0.73 0.75  --   --  0.44*   ANIONGAP 8 7  --   --  11   DENNY 8.5* 8.6*  --   --  7.9*   GLC 95 101*  --   --  128*   ALBUMIN  --   --   --   --  3.5   PROTTOTAL  --   --   --   --  5.7*   BILITOTAL  --   --   --   --  <0.2   ALKPHOS  --   --   --   --  81   ALT  --   --   --   --  16   AST  --   --   --   --  23       Recent Labs   Lab 05/08/23 0737 05/06/23  2349 05/06/23  2347   WBC 5.9 5.3 5.4   HGB 13.3 12.0 12.7   HCT 40.1 36.0 38.9   MCV 94 96 96    201 210     Recent Labs   Lab 05/09/23 0534 05/08/23 0737 05/07/23  1215 05/06/23  2347    139  --  140   POTASSIUM 3.9 3.8 3.8 3.0*   CHLORIDE 106 107  --  105   CO2 26 25  --  24   ANIONGAP 8 7  --  11   GLC 95 101*  --  128*   BUN 14.4 9.9  --  16.1   CR 0.73 0.75  --  0.44*   GFRESTIMATED 86 83  --  >90   DENNY 8.5* 8.6*  --  7.9*        Patient Active Problem List   Diagnosis     Acute ST elevation myocardial infarction (STEMI) (H)     NSTEMI (non-ST elevated myocardial infarction) (H)     Medications     heparin 750 Units/hr (05/09/23 0307)     - MEDICATION INSTRUCTIONS -       - MEDICATION INSTRUCTIONS -         aspirin  81 mg Oral Daily     buPROPion  150 mg Oral QAM     metoprolol tartrate  25 mg Oral BID     rivaroxaban ANTICOAGULANT  20 mg Oral Daily with supper     sodium chloride (PF)  3 mL Intracatheter Q8H       Data   Last 24 hours labs personally reviewed.  Echo: No results  found for this or any previous visit (from the past 4320 hour(s)).

## 2023-05-11 ENCOUNTER — TELEPHONE (OUTPATIENT)
Dept: CARDIOLOGY | Facility: CLINIC | Age: 73
End: 2023-05-11
Payer: COMMERCIAL

## 2023-05-11 NOTE — TELEPHONE ENCOUNTER
Patient was admitted to [facility] on 5/6/23 with chest pain and EKG changes-STEMI.    PMH: diagnosed with pulmonary embolus a month ago with negative hypercoagulable work-up and is currently on Xarelto    Echo showed EF of 55-60%. Left ventricular systolic function is normal. There is mild concentric left ventricular hypertrophy. Mid inferolateral to apical lateral severe hypokinesis. The right ventricle is normal in structure, function and size. No significant valve dysfunction.    5/7/23: Coronary angiogram via RRA showed spontaneous dissection of the first obtuse marginal branch of the left circumflex. The dissection is in the distal portion of the vessel, and the vessel is very tortuous. Typically percutaneous intervention is avoided if possible due to risk of propagating the dissection, and no intervention is recommended in her case where the vessel is dissected in a very distal and tortuous segment which would not be likely to be amenable to intervention. A run of sustained monomorphic ventricular tachycardia was noted during the procedure which resolved without intervention.    Pt was started on ASA and Metoprolol at time of discharge. Pt was discharged wearing a 14 day Zio Patch monitor. No lifting greater than 20#. Outpatient follow up and evaluation for fibromuscular dysplasia and consideration for screening for genetic connective tissue disorder recommended.    Called patient to discuss any post hospital d/c questions she may have, review medication changes, and confirm f/u appts. Questions answered regarding Metoprolol.    Patient denied any SOB, chest pain, fever or light headedness. States she has been more fatigued. Reminded of lifting restrictions.    RRA cardiac cath site is without bleeding, swelling, redness or signs of infection.     RN confirmed with patient that she will follow up with [facility] cardiology and states she is scheduled for an OV in June.    Patient verbalized understanding of all  information without further questions. JOAQUINA Grant RN.

## 2023-06-24 ENCOUNTER — HOSPITAL ENCOUNTER (OUTPATIENT)
Facility: CLINIC | Age: 73
Setting detail: OBSERVATION
Discharge: HOME | End: 2023-06-25
Attending: EMERGENCY MEDICINE | Admitting: INTERNAL MEDICINE
Payer: COMMERCIAL

## 2023-06-24 ENCOUNTER — APPOINTMENT (OUTPATIENT)
Dept: GENERAL RADIOLOGY | Facility: CLINIC | Age: 73
End: 2023-06-24
Attending: EMERGENCY MEDICINE
Payer: COMMERCIAL

## 2023-06-24 ENCOUNTER — APPOINTMENT (OUTPATIENT)
Dept: CARDIOLOGY | Facility: CLINIC | Age: 73
End: 2023-06-24
Attending: EMERGENCY MEDICINE
Payer: COMMERCIAL

## 2023-06-24 DIAGNOSIS — R07.9 CHEST PAIN, UNSPECIFIED TYPE: ICD-10-CM

## 2023-06-24 LAB
ALBUMIN UR-MCNC: NEGATIVE MG/DL
ANION GAP SERPL CALCULATED.3IONS-SCNC: 7 MMOL/L (ref 7–15)
APPEARANCE UR: CLEAR
ATRIAL RATE - MUSE: 60 BPM
ATRIAL RATE - MUSE: 72 BPM
BASOPHILS # BLD AUTO: 0 10E3/UL (ref 0–0.2)
BASOPHILS NFR BLD AUTO: 0 %
BILIRUB UR QL STRIP: NEGATIVE
BUN SERPL-MCNC: 16 MG/DL (ref 8–23)
CALCIUM SERPL-MCNC: 9.2 MG/DL (ref 8.8–10.2)
CHLORIDE SERPL-SCNC: 109 MMOL/L (ref 98–107)
COLOR UR AUTO: ABNORMAL
CREAT SERPL-MCNC: 0.84 MG/DL (ref 0.51–0.95)
CRP SERPL-MCNC: <3 MG/L
DEPRECATED HCO3 PLAS-SCNC: 26 MMOL/L (ref 22–29)
DIASTOLIC BLOOD PRESSURE - MUSE: NORMAL MMHG
DIASTOLIC BLOOD PRESSURE - MUSE: NORMAL MMHG
EOSINOPHIL # BLD AUTO: 0.3 10E3/UL (ref 0–0.7)
EOSINOPHIL NFR BLD AUTO: 6 %
ERYTHROCYTE [DISTWIDTH] IN BLOOD BY AUTOMATED COUNT: 13 % (ref 10–15)
ERYTHROCYTE [SEDIMENTATION RATE] IN BLOOD BY WESTERGREN METHOD: 2 MM/HR (ref 0–30)
GFR SERPL CREATININE-BSD FRML MDRD: 73 ML/MIN/1.73M2
GLUCOSE SERPL-MCNC: 116 MG/DL (ref 70–99)
GLUCOSE UR STRIP-MCNC: NEGATIVE MG/DL
HCT VFR BLD AUTO: 42.7 % (ref 35–47)
HGB BLD-MCNC: 14.2 G/DL (ref 11.7–15.7)
HGB UR QL STRIP: ABNORMAL
HOLD SPECIMEN: NORMAL
HOLD SPECIMEN: NORMAL
IMM GRANULOCYTES # BLD: 0 10E3/UL
IMM GRANULOCYTES NFR BLD: 0 %
INTERPRETATION ECG - MUSE: NORMAL
INTERPRETATION ECG - MUSE: NORMAL
KETONES UR STRIP-MCNC: NEGATIVE MG/DL
LEUKOCYTE ESTERASE UR QL STRIP: ABNORMAL
LVEF ECHO: NORMAL
LYMPHOCYTES # BLD AUTO: 1.3 10E3/UL (ref 0.8–5.3)
LYMPHOCYTES NFR BLD AUTO: 33 %
MCH RBC QN AUTO: 31.6 PG (ref 26.5–33)
MCHC RBC AUTO-ENTMCNC: 33.3 G/DL (ref 31.5–36.5)
MCV RBC AUTO: 95 FL (ref 78–100)
MONOCYTES # BLD AUTO: 0.4 10E3/UL (ref 0–1.3)
MONOCYTES NFR BLD AUTO: 10 %
MUCOUS THREADS #/AREA URNS LPF: PRESENT /LPF
NEUTROPHILS # BLD AUTO: 2 10E3/UL (ref 1.6–8.3)
NEUTROPHILS NFR BLD AUTO: 51 %
NITRATE UR QL: NEGATIVE
NRBC # BLD AUTO: 0 10E3/UL
NRBC BLD AUTO-RTO: 0 /100
P AXIS - MUSE: 12 DEGREES
P AXIS - MUSE: 14 DEGREES
PH UR STRIP: 6.5 [PH] (ref 5–7)
PLATELET # BLD AUTO: 229 10E3/UL (ref 150–450)
POTASSIUM SERPL-SCNC: 4 MMOL/L (ref 3.4–5.3)
PR INTERVAL - MUSE: 138 MS
PR INTERVAL - MUSE: 156 MS
QRS DURATION - MUSE: 74 MS
QRS DURATION - MUSE: 78 MS
QT - MUSE: 386 MS
QT - MUSE: 400 MS
QTC - MUSE: 400 MS
QTC - MUSE: 422 MS
R AXIS - MUSE: 6 DEGREES
R AXIS - MUSE: 7 DEGREES
RBC # BLD AUTO: 4.5 10E6/UL (ref 3.8–5.2)
RBC URINE: 2 /HPF
SODIUM SERPL-SCNC: 142 MMOL/L (ref 136–145)
SP GR UR STRIP: 1.01 (ref 1–1.03)
SQUAMOUS EPITHELIAL: 1 /HPF
SYSTOLIC BLOOD PRESSURE - MUSE: NORMAL MMHG
SYSTOLIC BLOOD PRESSURE - MUSE: NORMAL MMHG
T AXIS - MUSE: 122 DEGREES
T AXIS - MUSE: 131 DEGREES
TROPONIN T SERPL HS-MCNC: 7 NG/L
TROPONIN T SERPL HS-MCNC: 8 NG/L
UROBILINOGEN UR STRIP-MCNC: NORMAL MG/DL
VENTRICULAR RATE- MUSE: 60 BPM
VENTRICULAR RATE- MUSE: 72 BPM
WBC # BLD AUTO: 3.9 10E3/UL (ref 4–11)
WBC URINE: 3 /HPF

## 2023-06-24 PROCEDURE — 81001 URINALYSIS AUTO W/SCOPE: CPT | Performed by: EMERGENCY MEDICINE

## 2023-06-24 PROCEDURE — 99223 1ST HOSP IP/OBS HIGH 75: CPT | Performed by: INTERNAL MEDICINE

## 2023-06-24 PROCEDURE — 80048 BASIC METABOLIC PNL TOTAL CA: CPT | Performed by: EMERGENCY MEDICINE

## 2023-06-24 PROCEDURE — 93325 DOPPLER ECHO COLOR FLOW MAPG: CPT | Mod: 26 | Performed by: INTERNAL MEDICINE

## 2023-06-24 PROCEDURE — 99214 OFFICE O/P EST MOD 30 MIN: CPT | Mod: 25 | Performed by: INTERNAL MEDICINE

## 2023-06-24 PROCEDURE — 93308 TTE F-UP OR LMTD: CPT | Mod: 26 | Performed by: INTERNAL MEDICINE

## 2023-06-24 PROCEDURE — G0378 HOSPITAL OBSERVATION PER HR: HCPCS

## 2023-06-24 PROCEDURE — 999N000208 ECHOCARDIOGRAM LIMITED

## 2023-06-24 PROCEDURE — 93005 ELECTROCARDIOGRAM TRACING: CPT | Mod: 76

## 2023-06-24 PROCEDURE — 93321 DOPPLER ECHO F-UP/LMTD STD: CPT

## 2023-06-24 PROCEDURE — 36415 COLL VENOUS BLD VENIPUNCTURE: CPT | Performed by: EMERGENCY MEDICINE

## 2023-06-24 PROCEDURE — 255N000002 HC RX 255 OP 636: Performed by: EMERGENCY MEDICINE

## 2023-06-24 PROCEDURE — 93005 ELECTROCARDIOGRAM TRACING: CPT

## 2023-06-24 PROCEDURE — 99285 EMERGENCY DEPT VISIT HI MDM: CPT | Mod: 25

## 2023-06-24 PROCEDURE — 87086 URINE CULTURE/COLONY COUNT: CPT | Performed by: EMERGENCY MEDICINE

## 2023-06-24 PROCEDURE — 250N000013 HC RX MED GY IP 250 OP 250 PS 637: Performed by: EMERGENCY MEDICINE

## 2023-06-24 PROCEDURE — 86140 C-REACTIVE PROTEIN: CPT | Performed by: INTERNAL MEDICINE

## 2023-06-24 PROCEDURE — 85652 RBC SED RATE AUTOMATED: CPT | Performed by: INTERNAL MEDICINE

## 2023-06-24 PROCEDURE — 93321 DOPPLER ECHO F-UP/LMTD STD: CPT | Mod: 26 | Performed by: INTERNAL MEDICINE

## 2023-06-24 PROCEDURE — 85025 COMPLETE CBC W/AUTO DIFF WBC: CPT | Performed by: EMERGENCY MEDICINE

## 2023-06-24 PROCEDURE — 250N000013 HC RX MED GY IP 250 OP 250 PS 637: Performed by: INTERNAL MEDICINE

## 2023-06-24 PROCEDURE — 84484 ASSAY OF TROPONIN QUANT: CPT | Performed by: EMERGENCY MEDICINE

## 2023-06-24 PROCEDURE — 71046 X-RAY EXAM CHEST 2 VIEWS: CPT

## 2023-06-24 RX ORDER — ACETAMINOPHEN 650 MG/1
650 SUPPOSITORY RECTAL EVERY 6 HOURS PRN
Status: DISCONTINUED | OUTPATIENT
Start: 2023-06-24 | End: 2023-06-25 | Stop reason: HOSPADM

## 2023-06-24 RX ORDER — AMOXICILLIN 250 MG
1 CAPSULE ORAL 2 TIMES DAILY PRN
Status: DISCONTINUED | OUTPATIENT
Start: 2023-06-24 | End: 2023-06-25 | Stop reason: HOSPADM

## 2023-06-24 RX ORDER — CYCLOBENZAPRINE HCL 10 MG
10 TABLET ORAL ONCE
Status: COMPLETED | OUTPATIENT
Start: 2023-06-24 | End: 2023-06-24

## 2023-06-24 RX ORDER — ONDANSETRON 2 MG/ML
4 INJECTION INTRAMUSCULAR; INTRAVENOUS EVERY 6 HOURS PRN
Status: DISCONTINUED | OUTPATIENT
Start: 2023-06-24 | End: 2023-06-25 | Stop reason: HOSPADM

## 2023-06-24 RX ORDER — LIDOCAINE 4 G/G
1 PATCH TOPICAL
Status: DISCONTINUED | OUTPATIENT
Start: 2023-06-24 | End: 2023-06-25 | Stop reason: HOSPADM

## 2023-06-24 RX ORDER — AMOXICILLIN 250 MG
2 CAPSULE ORAL 2 TIMES DAILY PRN
Status: DISCONTINUED | OUTPATIENT
Start: 2023-06-24 | End: 2023-06-25 | Stop reason: HOSPADM

## 2023-06-24 RX ORDER — METOPROLOL TARTRATE 25 MG/1
25 TABLET, FILM COATED ORAL 2 TIMES DAILY
Status: DISCONTINUED | OUTPATIENT
Start: 2023-06-24 | End: 2023-06-25 | Stop reason: HOSPADM

## 2023-06-24 RX ORDER — ONDANSETRON 4 MG/1
4 TABLET, ORALLY DISINTEGRATING ORAL EVERY 6 HOURS PRN
Status: DISCONTINUED | OUTPATIENT
Start: 2023-06-24 | End: 2023-06-25 | Stop reason: HOSPADM

## 2023-06-24 RX ORDER — ACETAMINOPHEN 325 MG/1
650 TABLET ORAL EVERY 6 HOURS PRN
Status: DISCONTINUED | OUTPATIENT
Start: 2023-06-24 | End: 2023-06-25 | Stop reason: HOSPADM

## 2023-06-24 RX ADMIN — METOPROLOL TARTRATE 25 MG: 25 TABLET, FILM COATED ORAL at 19:35

## 2023-06-24 RX ADMIN — RIVAROXABAN 20 MG: 10 TABLET, FILM COATED ORAL at 19:35

## 2023-06-24 RX ADMIN — CYCLOBENZAPRINE 10 MG: 10 TABLET, FILM COATED ORAL at 12:06

## 2023-06-24 RX ADMIN — HUMAN ALBUMIN MICROSPHERES AND PERFLUTREN 9 ML: 10; .22 INJECTION, SOLUTION INTRAVENOUS at 14:43

## 2023-06-24 RX ADMIN — LIDOCAINE 1 PATCH: 560 PATCH PERCUTANEOUS; TOPICAL; TRANSDERMAL at 12:07

## 2023-06-24 ASSESSMENT — ACTIVITIES OF DAILY LIVING (ADL)
ADLS_ACUITY_SCORE: 35
ADLS_ACUITY_SCORE: 30
ADLS_ACUITY_SCORE: 30
ADLS_ACUITY_SCORE: 35

## 2023-06-24 NOTE — ED NOTES
[facility]  ED Nurse Handoff Report    ED Chief complaint: Chest Pain      ED Diagnosis:   Final diagnoses:   Chest pain, unspecified type       Code Status: Full Code    Allergies:   Allergies   Allergen Reactions    Codeine Nausea and Vomiting     PN: LW Reaction: Vomiting  PN: LW Reaction: Vomiting      Ampicillin Rash     PN: LW Reaction: Rash, Generalized  PN: LW Reaction: Rash, Generalized      Cefaclor Rash     Has tolerated in the past 09/2022      Penicillin G Rash    Sulfa Antibiotics Unknown and Rash     PN: LW Reaction: Unknown Reaction      Sulfatolamide Rash       Patient Story: chest pain  Focused Assessment:  Patient presents to the ER for evaluation of left mid back pain that reminded her of symptoms prior to presenting to the hospital last month with STEMI versus NSTEMI and found to have SCAD on coronary angiogram.  Vital signs reassuring.  ECG shows nonspecific ST/T wave changes in inferior and lateral precordial leads.  Serial troponin testing is negative.  Here in the ER her symptoms have resolved.  There is some tenderness of the left thoracic paraspinal muscular region which could indicate musculoskeletal etiology of her symptoms.  She was given Flexeril and lidocaine patch.  Given underlying history, cardiology was consulted with plans for limited echocardiogram and bedside consult    Treatments and/or interventions provided: flexeril, lido patch  Patient's response to treatments and/or interventions:       To be done/followed up on inpatient unit:        Does this patient have any cognitive concerns?:  none    Activity level - Baseline/Home:  Independent  Activity Level - Current:   Independent    Patient's Preferred language: English   Needed?: No    Isolation: None  Infection: Not Applicable  Patient tested for COVID 19 prior to admission: NO  Bariatric?: No    Vital Signs:   Vitals:    06/24/23 1114   BP: (!) 151/82   Pulse: 73   Resp: 18   Temp: 98.7  F (37.1  " C)   TempSrc: Temporal   SpO2: 96%   Weight: 63.5 kg (140 lb)   Height: 1.651 m (5' 5\")       Cardiac Rhythm:     Was the PSS-3 completed:   Yes  What interventions are required if any?               Family Comments: spouse  OBS brochure/video discussed/provided to patient/family: N/A              Name of person given brochure if not patient:                 Relationship to patient:       For the majority of the shift this patient's behavior was Green.   Behavioral interventions performed were   .    ED NURSE PHONE NUMBER: [id number]         "

## 2023-06-24 NOTE — H&P
[facility]    History and Physical - Hospitalist Service       Date of Admission:  6/24/2023    Assessment & Plan      Brii Cook is a 72 year old female with past medical history of depression, anxiety, small RL L unprovoked pulmonary embolism diagnosed in 4/2023 for which she is on on chronic anticoagulation with DOAC and recent episode of chest pain attributed to scad who was admitted to observation status on 6/24/2023 for evaluation of chest pain.    Chest/back pain unclear etiology  Recent diagnosis of spontaneous coronary artery dissection (SCAD)  Hx of unprovoked small RLL PE in 4/2023, on anticoagulation with DOAC  * Was recently hospitalized from 5/6/23 -5/9/23 for evaluation of chest pain that was ultimately found to be secondary to SCAD. During that stay she had initially presented with chest pain with atypical features. Her initial EKG showed ST elevations in V4 through V6. Symptoms improved with nitro. CTA was negative for PE. She underwent left heart cath on 5/7/2023 that showed a spontaneous dissection of the first obtuse marginal branch of the left circumflex. The dissection was in the distal portion of the vessel and the vessel itself was noted to be very tortuous. No coronary atherosclerosis was appreciated. During cardiac cath patient was noted to have a run of sustained monomorphic VT. Echo that stay showed EF of 55 to 60%. Occasions were adjusted, she was started on low-dose aspirin (81mg daily x 30 days) and metoprolol. No recurrent runs of VT noted during the remainder of her hospitalization . she was continued on her Xarelto. Since discharge she has been following with cardiac rehab. She was also referred to vascular medicine service for management. She was discharged on a cardiac monitor.  * She presented to the ED on day of admission for evaluation of  back pain. It had started the night prior.  Described as left mid back discomfort. No associated  shortness of breath, cough, lower extremity edema.  Is been compliant with her medications including aspirin (has since completed 30d course) and anticoagulation. Results of her cardiac monitor showed sinus rhythm with occasional PVCs. Patient noted that as she has begun feeling better she has become more active. Yesterday she had a good day and said she went up and down the steps in her home several times, which is more active than she has been recently. She also comments that during time she has been more active she seems during the day she seems to have some back discomfort in the evenings.  * In the ED, was afebrile, VSS. Exam was unrevealing. Labs were unremarkable. Troponin was negative. WBC 3.9. CRP was negative. UA was bland. EKG showed improvements in the ST-T segment changes in the lateral leads as compared to EKG on 5/6/23. Cardiology service was contacted, recommended a repeat echocardiogram and admission for observation.  Repeat echo was done in the ED, showed EF 50 to 55%, there was moderate lateral wall hypokinesis though wall motion severity was mildly improved compared to pre or prior echo on 5/8/23, no pericardial effusion.  Lidoderm patch was placed in the ED but patient notes her pain had actually improved prior to this being placed.  -- monitor on telemetry   -- cont anticoagulation, metoprolol  -- cardiology to reassess in a.m.    Depression, anxiety  * Chronic and stable on Wellbutrin.  Will hold given admission under observation status and anticipated short hospitalization     Diet:  Regular diet  DVT Prophylaxis: DOAC  Horton Catheter: Not present  Lines: None     Cardiac Monitoring: None  Code Status:  Full code    Clinically Significant Risk Factors Present on Admission               # Drug Induced Coagulation Defect: home medication list includes an anticoagulant medication  # Drug Induced Platelet Defect: home medication list includes an antiplatelet medication                 Disposition  Plan      Expected Discharge Date: 06/25/2023                  Krystal Montanez DO  Hospitalist Service  [facility]  Securely message with [employer] (more info)  Text page via [employer] Paging/Directory     ______________________________________________________________________    Chief Complaint   Chest and back pain    History is obtained from the patient    History of Present Illness   Brii Cook is a 72 year old female with past medical history of depression, anxiety, small RL L unprovoked pulmonary embolism diagnosed in 4/2023 for which she is on on chronic anticoagulation with DOAC and recent episode of chest pain attributed to SCAD who presented to the ED for evaluation of chest pain.    Was recently hospitalized from 5/6/23 -5/9/23 for evaluation of chest pain that was ultimately found to be secondary to SCAD. During that stay she had initially presented with chest pain with atypical features. Her initial EKG showed ST elevations in V4 through V6. Symptoms improved with nitro. CTA was negative for PE. She underwent left heart cath on 5/7/2023 that showed a spontaneous dissection of the first obtuse marginal branch of the left circumflex. The dissection was in the distal portion of the vessel and the vessel itself was noted to be very tortuous. No coronary atherosclerosis was appreciated.  During cardiac cath patient was noted to have a run of sustained monomorphic VT. Echo that stay showed EF of 55 to 60%. Occasions were adjusted, she was started on low-dose aspirin (81mg daily x 30 days) and metoprolol. No recurrent runs of VT noted during the remainder of her hospitalization . she was continued on her Xarelto. Since discharge she has been following with cardiac rehab.  She was also referred to vascular medicine service for management.  She was discharged on a cardiac monitor.    She presented to the ED on day of admission for evaluation of  back pain.  It had started the  night prior.  Described as left mid back discomfort.  No associated shortness of breath, cough, lower extremity edema.  Is been compliant with her medications including aspirin (has since completed 30d course) and anticoagulation.  Results of her cardiac monitor showed sinus rhythm with occasional PVCs. Patient noted that as she has begun feeling better she has become more active.  Yesterday she had a good day and said she went up and down the steps in her home several times, which is more active than she has been recently.  She also comments that during time she has been more active she seems during the day she seems to have some back discomfort in the evenings.    In the ED, was afebrile, VSS. Exam was unrevealing. Labs were unremarkable. Troponin was negative. WBC 3.9. CRP was negative. UA was bland. EKG showed improvements in the ST-T segment changes in the lateral leads as compared to EKG on 5/6/23. Cardiology service was contacted, recommended a repeat echocardiogram and admission for observation. Repeat echo was done in the ED, showed EF 50 to 55%, there was moderate lateral wall hypokinesis though wall motion severity was mildly improved compared to pre or prior echo on 5/8/23, no pericardial effusion. Lidoderm patch was placed in the ED, but patient notes her pain had actually improved prior to this being placed      Past Medical History    Past Medical History:   Diagnosis Date     Depression      Pulmonary embolism (H)        Past Surgical History   Past Surgical History:   Procedure Laterality Date     CV CORONARY ANGIOGRAM N/A 5/7/2023    Procedure: Coronary Angiogram;  Surgeon: Lily Darling MD;  Location:  HEART CARDIAC CATH LAB     CV LEFT HEART CATH N/A 5/7/2023    Procedure: Left Heart Catheterization;  Surgeon: Lily Darling MD;  Location:  HEART CARDIAC CATH LAB     NO HISTORY OF SURGERY         Prior to Admission Medications       Medication Sig Last Dose Taking? Auth Provider    buPROPion (WELLBUTRIN XL) 150 MG 24 hr tablet Take 150 mg by mouth daily Takes in afternoon/evening 6/23/2023 at PM Yes Unknown, Entered By History   MAGNESIUM PO Take 1 tablet by mouth daily Unknown Mg salt/tablet strength Past Week at Unknown time Yes Unknown, Entered By History   metoprolol tartrate (LOPRESSOR) 25 MG tablet Take 1 tablet (25 mg) by mouth 2 times daily 6/24/2023 at AM Yes Daxa Garland MD   rivaroxaban ANTICOAGULANT (XARELTO) 20 MG TABS tablet Take 1 tablet (20 mg) by mouth daily (with dinner) 6/23/2023 at PM Yes Daxa Garland MD       Review of Systems    The 10 point Review of Systems is negative other than noted in the HPI or here.      Physical Exam   Vital Signs: Temp: 98.7  F (37.1  C) Temp src: Temporal BP: (!) 151/82 Pulse: 73   Resp: 18 SpO2: 96 % O2 Device: None (Room air)    Weight: 140 lbs 0 oz    General Appearance: WN WD female, alert and conversing appropriately, NAD  Respiratory: CTA B, no wheeze/rales/rhonchi, no increased work of breathing  Cardiovascular: HRRR, no MGR, no LE edema  GI: S, NT, ND, + BS  Skin: Warm/dry  Other:     Medical Decision Making       75 MINUTES SPENT BY ME on the date of service doing chart review, history, exam, documentation & further activities per the note.      Data   ------------------------- PAST 24 HR DATA REVIEWED -----------------------------------------------    I have personally reviewed the following data over the past 24 hrs:    3.9 (L)  \   14.2   / 229     142 109 (H) 16.0 /  116 (H)   4.0 26 0.84 \       Trop: 8 BNP: N/A       Procal: N/A CRP: <3.00 Lactic Acid: N/A         Imaging results reviewed over the past 24 hrs:   Recent Results (from the past 24 hour(s))   XR Chest 2 Views    Narrative    EXAM: XR CHEST 2 VIEWS  LOCATION: [facility]  DATE: 06/24/2023    INDICATION: Chest pain.  COMPARISON: 05/07/2013 CT.      Impression    IMPRESSION: Benign calcified granuloma right upper lobe. Strand of fibrosis  and/or linear atelectasis in the right middle lobe and lingula. Lungs are otherwise clear. No pleural effusion. Heart size and pulmonary vascularity within normal limits. Plate   and screw right clavicle. No significant change.     Echocardiogram Limited   Result Value    LVEF  50-55%    Narrative    [id number]  [id number]  [id number]  [id number]^MINI^TOM     [facility]  Echocardiography Laboratory  [address] [place] [address]     Name: PAYAL MOYA  MRN: [id number]  : 1950  Study Date: 2023 02:15 PM  Age: 72 yrs  Gender: Female  Patient Location: [facility]  Reason For Study: Abn EKG  Ordering Physician: TOM MORSE  Referring Physician: Kiera Alatorre  Performed By: Kevin Barger     BSA: 1.7 m2  Height: 65 in  Weight: 140 lb  HR: 57  BP: 151/82 mmHg  ______________________________________________________________________________  Procedure  Limited Echo Adult. Optison (NDC #[id number]) given intravenously.  ______________________________________________________________________________  Interpretation Summary     The visual ejection fraction is 50-55%.  There is moderate lateral wall hypokinesis.  No significant valvular heart disease.     Wall motion severity mildy improved compared to previous echo 2023.  ______________________________________________________________________________  Left Ventricle  The left ventricle is normal in size. There is normal left ventricular wall  thickness. The visual ejection fraction is 50-55%. There is moderate lateral  wall hypokinesis.     Right Ventricle  The right ventricle is normal in size and function.     Atria  Normal left atrial size. Right atrial size is normal. There is no color  Doppler evidence of an atrial shunt.     Mitral Valve  The mitral valve leaflets are mildly thickened. There is trace mitral  regurgitation.     Tricuspid Valve  There is trace tricuspid regurgitation. The right ventricular systolic  pressure  is approximated at 16.3 mmHg plus the right atrial pressure.     Aortic Valve  There is trivial trileaflet aortic sclerosis. No aortic regurgitation is  present.     Pulmonic Valve  There is trace pulmonic valvular regurgitation.     Vessels  Normal size aorta. The aortic root is normal size.     Pericardium  There is no pericardial effusion.     Rhythm  Sinus rhythm was noted.  ______________________________________________________________________________  MMode/2D Measurements & Calculations  IVSd: 0.94 cm     LVIDd: 4.9 cm  LVIDs: 2.1 cm  LVPWd: 0.89 cm  LVPWs: 5.0 cm  FS: 55.8 %  LV mass(C)d: 153.9 grams  LV mass(C)dI: 90.5 grams/m2  LA dimension: 2.8 cm  asc Aorta Diam: 2.9 cm  RWT: 0.37     Doppler Measurements & Calculations  TR max ian: 202.0 cm/sec  TR max P.3 mmHg     ______________________________________________________________________________  Report approved by: Rachel Dumont 2023 03:24 PM

## 2023-06-24 NOTE — ED PROVIDER NOTES
"  History     Chief Complaint:  Chest Pain       HPI   rBii Cook is a 72 year old female who presents to the ER for evaluation of left mid back discomfort since 8 PM last night.  Patient cannot think of preceding traumatic injuries.  She trouble sleeping due to the pain.  No shortness of breath or fever or cough.  No urinary symptoms but she does report a history of kidney stones.  Patient was diagnosed with a SCAD in the Cath Lab about 1 month ago.  Patient is also maintained on Xarelto for recently diagnosed PE.  She is adherent to this anticoagulation.  Currently in the ER her symptoms seem to be resolving.  She cannot think of any provocative or modifying factors for her discomfort.        Medications:    aspirin (ASA) 81 MG EC tablet  buPROPion (WELLBUTRIN XL) 150 MG 24 hr tablet  metoprolol tartrate (LOPRESSOR) 25 MG tablet  rivaroxaban ANTICOAGULANT (XARELTO) 20 MG TABS tablet        Past Medical History:    Past Medical History:   Diagnosis Date     Depression      Pulmonary embolism (H)        Past Surgical History:    Past Surgical History:   Procedure Laterality Date     CV CORONARY ANGIOGRAM N/A 5/7/2023    Procedure: Coronary Angiogram;  Surgeon: Lily Darling MD;  Location:  HEART CARDIAC CATH LAB     CV LEFT HEART CATH N/A 5/7/2023    Procedure: Left Heart Catheterization;  Surgeon: Lily Darling MD;  Location:  HEART CARDIAC CATH LAB     NO HISTORY OF SURGERY          Physical Exam     Patient Vitals for the past 24 hrs:   BP Temp Temp src Pulse Resp SpO2 Height Weight   06/24/23 1114 (!) 151/82 98.7  F (37.1  C) Temporal 73 18 96 % 1.651 m (5' 5\") 63.5 kg (140 lb)        Physical Exam  VS: Reviewed per above  HENT: normal speech  EYES: sclera anicteric  CV: Rate as noted,  regular rhythm.   RESP: Effort normal. Breath sounds are normal bilaterally.  GI: no tenderness/rebound/guarding, not distended.  NEURO: Alert, moving all extremities  MSK: No deformity of the extremities, " mild ttp over the left paraspinal thoracic spine.  SKIN: Warm and dry    Emergency Department Course   ECG  ECG results from 06/24/23   EKG 12-lead, tracing only     Value    Systolic Blood Pressure     Diastolic Blood Pressure     Ventricular Rate 72    Atrial Rate 72    HI Interval 138    QRS Duration 78        QTc 422    P Axis 14    R AXIS 6    T Axis 131    Interpretation ECG      Sinus rhythm  Cannot rule out Anterior infarct , age undetermined  Abnormal ECG  When compared with ECG of 07-MAY-2023 10:04,  Nonspecific T wave abnormality now evident in Inferior leads  Nonspecific T wave abnormality now evident in Lateral leads     EKG 12 lead     Value    Systolic Blood Pressure     Diastolic Blood Pressure     Ventricular Rate 60    Atrial Rate 60    HI Interval 156    QRS Duration 74        QTc 400    P Axis 12    R AXIS 7    T Axis 122    Interpretation ECG      Sinus rhythm  Low voltage QRS  Nonspecific ST and T wave abnormality  Abnormal ECG  When compared with ECG of 24-JUN-2023 11:29, (unconfirmed)  Nonspecific T wave abnormality no longer evident in Inferior leads  Confirmed by GENERATED REPORT, COMPUTER ([id number]),  Kenan Bermudez ([id number]) on 6/24/2023 1:52:02 PM         Imaging:  XR Chest 2 Views   Final Result   IMPRESSION: Benign calcified granuloma right upper lobe. Strand of fibrosis and/or linear atelectasis in the right middle lobe and lingula. Lungs are otherwise clear. No pleural effusion. Heart size and pulmonary vascularity within normal limits. Plate    and screw right clavicle. No significant change.         Echocardiogram Limited    (Results Pending)      Report per radiology    Laboratory:  Labs Ordered and Resulted from Time of ED Arrival to Time of ED Departure   BASIC METABOLIC PANEL - Abnormal       Result Value    Sodium 142      Potassium 4.0      Chloride 109 (*)     Carbon Dioxide (CO2) 26      Anion Gap 7      Urea Nitrogen 16.0      Creatinine 0.84      Calcium  9.2      Glucose 116 (*)     GFR Estimate 73     CBC WITH PLATELETS AND DIFFERENTIAL - Abnormal    WBC Count 3.9 (*)     RBC Count 4.50      Hemoglobin 14.2      Hematocrit 42.7      MCV 95      MCH 31.6      MCHC 33.3      RDW 13.0      Platelet Count 229      % Neutrophils 51      % Lymphocytes 33      % Monocytes 10      % Eosinophils 6      % Basophils 0      % Immature Granulocytes 0      NRBCs per 100 WBC 0      Absolute Neutrophils 2.0      Absolute Lymphocytes 1.3      Absolute Monocytes 0.4      Absolute Eosinophils 0.3      Absolute Basophils 0.0      Absolute Immature Granulocytes 0.0      Absolute NRBCs 0.0     ROUTINE UA WITH MICROSCOPIC REFLEX TO CULTURE - Abnormal    Color Urine Light Yellow      Appearance Urine Clear      Glucose Urine Negative      Bilirubin Urine Negative      Ketones Urine Negative      Specific Gravity Urine 1.012      Blood Urine Trace (*)     pH Urine 6.5      Protein Albumin Urine Negative      Urobilinogen Urine Normal      Nitrite Urine Negative      Leukocyte Esterase Urine Moderate (*)     Mucus Urine Present (*)     RBC Urine 2      WBC Urine 3      Squamous Epithelials Urine 1     TROPONIN T, HIGH SENSITIVITY - Normal    Troponin T, High Sensitivity 7     TROPONIN T, HIGH SENSITIVITY - Normal    Troponin T, High Sensitivity 8     ERYTHROCYTE SEDIMENTATION RATE AUTO   CRP INFLAMMATION   URINE CULTURE          Emergency Department Course & Assessments:             Interventions:  Medications   Lidocaine (LIDOCARE) 4 % Patch 1 patch (1 patch Transdermal $Patch/Med Applied 6/24/23 1207)   lidocaine patch in PLACE (has no administration in time range)   cyclobenzaprine (FLEXERIL) tablet 10 mg (10 mg Oral $Given 6/24/23 1206)          Disposition:  Care of the patient was transferred to my colleague pending cardiology consult/echo.     Impression & Plan        Medical Decision Making:  Patient presents to the ER for evaluation of left mid back pain that reminded her of  symptoms prior to presenting to the hospital last month with STEMI versus NSTEMI and found to have SCAD on coronary angiogram.  Vital signs reassuring.  ECG shows nonspecific ST/T wave changes in inferior and lateral precordial leads.  Serial troponin testing is negative.  Here in the ER her symptoms have resolved.  There is some tenderness of the left thoracic paraspinal muscular region which could indicate musculoskeletal etiology of her symptoms.  She was given Flexeril and lidocaine patch.  Given underlying history, cardiology was consulted with plans for limited echocardiogram and bedside consult.  At signout to my colleague this was pending.      Diagnosis:    ICD-10-CM    1. Chest pain, unspecified type  R07.9            Discharge Medications:  New Prescriptions    No medications on file           Erik Rios MD  06/24/23 0880

## 2023-06-24 NOTE — PROGRESS NOTES
RECEIVING UNIT ED HANDOFF REVIEW    ED Nurse Handoff Report was reviewed by: Hesham Bonilla RN on June 24, 2023 at 5:29 PM

## 2023-06-24 NOTE — PHARMACY-ADMISSION MEDICATION HISTORY
Pharmacist Admission Medication History    Admission medication history is complete. The information provided in this note is only as accurate as the sources available at the time of the update.    Medication reconciliation/reorder completed by provider prior to medication history? No    Information Source(s): Patient and CareEverywhere/SureScripts via in-person    Pertinent Information: Bupropion last filled 10/25/22 #30ds #30. This was added to PTA med list as of 5/7/23 medication history note, patient states still taking on interview 6/24/23. Unclear source of home supply.   Aspirin prescribed for 30 days 5/9/23, confirmed this has not been continued past 30 days per patient.     Changes made to PTA medication list:    Added: magnesium     Deleted: aspirin 81 mg daily (prescribed for #30d, completed)     Changed: wellbutrin from AM to PM dosing    Medication Affordability: no concern in the past 3 months when not taking medications as prescribed because of cost.     Allergies reviewed with patient and updates made in EHR: yes    Medication History Completed By: Almaz Greene [facility] 6/24/2023 5:11 PM    Medication Sig Last Dose Taking? Auth Provider   buPROPion (WELLBUTRIN XL) 150 MG 24 hr tablet Take 150 mg by mouth daily Takes in afternoon/evening 6/23/2023 at PM Yes Unknown, Entered By History   MAGNESIUM PO Take 1 tablet by mouth daily Unknown Mg salt/tablet strength Past Week at Unknown time Yes Unknown, Entered By History   metoprolol tartrate (LOPRESSOR) 25 MG tablet Take 1 tablet (25 mg) by mouth 2 times daily 6/24/2023 at AM Yes Daxa Garland MD   rivaroxaban ANTICOAGULANT (XARELTO) 20 MG TABS tablet Take 1 tablet (20 mg) by mouth daily (with dinner) 6/23/2023 at PM Yes Daxa Garland MD

## 2023-06-24 NOTE — CONSULTS
[facility]    Cardiology Consultation     Date of Admission:  6/24/2023  Reason for consult: CP, hx SCAD    Assessment & Plan     1. CP, resolved  2. Hx SCAD Lcx/OM 6/7/23  3. Recent small PE on Xarelto  4. Normal LV function, EF 55-60%, mild inferolateral to apical HK, mild LVH    Plan  Echocardiogram today and overnight observation. ECG shows improvement in the lateral ST elevations since 5/6 and trop negative.   Likely dismissal tomorrow am if asymptomatic.     Thank you for the consult. Please call with questions.     Singh Ramesh MD    History of Present Illness   Brii Cook is a 72 year old female with recent hx of CP and lateral STEMI 5/6/23, coronary angiogram showed Lcx/OM SCAD, medically managed and CT showed RLL PE for which she was started on Xarelto. During cath she had MMVT, outpatient Zio showed PVCs. She attended cardiac rehab. Yesterday around 8 pm she developed R sided (previously L sided before SCAD) shoulder/back pain. She was working at her home and climbing stairs but not lifted anything heavy. The pain lasted about 14-16 hours then resolved, now she is CP free. ECG showed non specific ST/T changes changes and previous ST segment elevation in lateral leads has resolved, troponin negative. Treated with flexeril and lidocaine patch. Echo pending. Previous TTE showed normal EF, septal hypertrophy, mild, and hypokinesis in apical inferolateral segments. She has been adherent with OAC.       Past Medical History   Past Medical History:   Diagnosis Date     Depression      Pulmonary embolism (H)         Past Surgical History   Past Surgical History:   Procedure Laterality Date     CV CORONARY ANGIOGRAM N/A 5/7/2023    Procedure: Coronary Angiogram;  Surgeon: Lily Darling MD;  [facility]     CV LEFT HEART CATH N/A 5/7/2023    Procedure: Left Heart Catheterization;  Surgeon: Lily Darling MD;  [facility] C[facility]  CATH LAB     NO HISTORY OF SURGERY          Prior to Admission Medications   Prior to Admission Medications   Prescriptions Last Dose Informant Patient Reported? Taking?   aspirin (ASA) 81 MG EC tablet   No No   Sig: Take 1 tablet (81 mg) by mouth daily   buPROPion (WELLBUTRIN XL) 150 MG 24 hr tablet   Yes No   Sig: Take 150 mg by mouth every morning   metoprolol tartrate (LOPRESSOR) 25 MG tablet   No No   Sig: Take 1 tablet (25 mg) by mouth 2 times daily   rivaroxaban ANTICOAGULANT (XARELTO) 20 MG TABS tablet   Yes No   Sig: Take 1 tablet (20 mg) by mouth daily (with dinner)      Facility-Administered Medications: None     Allergies   Allergies   Allergen Reactions     Codeine Nausea and Vomiting     PN: LW Reaction: Vomiting  PN: LW Reaction: Vomiting       Ampicillin Rash     PN: LW Reaction: Rash, Generalized  PN: LW Reaction: Rash, Generalized       Cefaclor Rash     Has tolerated in the past 09/2022       Penicillin G Rash     Sulfa Antibiotics Unknown and Rash     PN: LW Reaction: Unknown Reaction       Sulfatolamide Rash       Social History   I have reviewed this patient's social history and updated it with pertinent information if needed. Brii Cook      Family History   I have reviewed this patient's family history and updated it with pertinent information if needed.   Family History   Problem Relation Age of Onset     Coronary Artery Disease No family hx of      Cardiomyopathy No family hx of        Code Status    Prior    Review of Systems   12 point review of systems reviewed and as noted in History of Present Illness    Physical Exam   Temp: 98.7  F (37.1  C) Temp src: Temporal BP: (!) 151/82 Pulse: 73   Resp: 18 SpO2: 96 % O2 Device: None (Room air)    Vital Signs with Ranges  Temp:  [98.7  F (37.1  C)] 98.7  F (37.1  C)  Pulse:  [73] 73  Resp:  [18] 18  BP: (151)/(82) 151/82  SpO2:  [96 %] 96 %  140 lbs 0 oz    Constitutional: Awake, alert, cooperative, no apparent distress.  Respiratory:  Clear to auscultation bilaterally, no crackles or wheezing.  Neck: No JVD   Cardiovascular: Regular rhythm, normal S1 and S2, and no murmur noted.  Skin: No rashes, no cyanosis  Psychiatric: Alert, oriented to person, place and time, no obvious anxiety or depression.   Extremities: No lower or upper extremity edema, no clubbing or cyanosis  Vascular: Radial pulses 4+ and symmetric bilaterally      Data   Most Recent 3 CBC's:  Recent Labs   Lab Test 06/24/23  1123 05/08/23  0737 05/06/23  2349   WBC 3.9* 5.9 5.3   HGB 14.2 13.3 12.0   MCV 95 94 96    184 201     Most Recent 3 BMP's:  Recent Labs   Lab Test 06/24/23  1123 05/09/23  0534 05/08/23  0737    140 139   POTASSIUM 4.0 3.9 3.8   CHLORIDE 109* 106 107   CO2 26 26 25   BUN 16.0 14.4 9.9   CR 0.84 0.73 0.75   ANIONGAP 7 8 7   DENNY 9.2 8.5* 8.6*   * 95 101*     Most Recent 3 Troponin's:No lab results found.  Most Recent 3 BNP's:  Recent Labs   Lab Test 05/07/23  0418   NTBNPI 345     Most Recent Cholesterol Panel:  Recent Labs   Lab Test 05/07/23 0418   CHOL 169   LDL 91   HDL 73   TRIG 26

## 2023-06-24 NOTE — ED TRIAGE NOTES
"Patient recently hospitalized for a \"SCAD heart attack\", presenting today with complaints of upper back pain that started last night making it difficult to sleep. Patient stated that the pain is similar to when she had her heart attack but it is in a different place. Patient complained of nausea but denied shortness of breath and dizziness.      Triage Assessment     Row Name 06/24/23 1111       Triage Assessment (Adult)    Airway WDL WDL       Respiratory WDL    Respiratory WDL WDL       Skin Circulation/Temperature WDL    Skin Circulation/Temperature WDL WDL       Cardiac WDL    Cardiac WDL WDL       Peripheral/Neurovascular WDL    Peripheral Neurovascular WDL WDL       Cognitive/Neuro/Behavioral WDL    Cognitive/Neuro/Behavioral WDL WDL              "

## 2023-06-24 NOTE — ED PROVIDER NOTES
I received patient in signout from Dr. Rios.  Please refer to their complete H&P for further information.  Briefly, patient has a recent of a spontaneous coronary artery dissection and came with chest pain. At time of signout, echo was pending as well as cardiology consultation.  Echo returned and showed improved wall motion abnormality.      I consulted with cardiology Dr. Ramesh after his evaluation.  He recommended observation admission with likely discharge tomorrow.  I consulted with Dr. Trinity Montanez of the hospitalist service who graciously agreed to admit the patient.       Melanie Aguiar MD  06/24/23 5945

## 2023-06-25 VITALS
DIASTOLIC BLOOD PRESSURE: 73 MMHG | OXYGEN SATURATION: 95 % | TEMPERATURE: 98.1 F | SYSTOLIC BLOOD PRESSURE: 115 MMHG | HEART RATE: 65 BPM | HEIGHT: 65 IN | WEIGHT: 140 LBS | BODY MASS INDEX: 23.32 KG/M2 | RESPIRATION RATE: 17 BRPM

## 2023-06-25 PROCEDURE — 99239 HOSP IP/OBS DSCHRG MGMT >30: CPT | Performed by: HOSPITALIST

## 2023-06-25 PROCEDURE — 250N000013 HC RX MED GY IP 250 OP 250 PS 637: Performed by: INTERNAL MEDICINE

## 2023-06-25 PROCEDURE — 99213 OFFICE O/P EST LOW 20 MIN: CPT | Performed by: INTERNAL MEDICINE

## 2023-06-25 PROCEDURE — G0378 HOSPITAL OBSERVATION PER HR: HCPCS

## 2023-06-25 RX ADMIN — METOPROLOL TARTRATE 25 MG: 25 TABLET, FILM COATED ORAL at 08:19

## 2023-06-25 ASSESSMENT — ACTIVITIES OF DAILY LIVING (ADL)
ADLS_ACUITY_SCORE: 30

## 2023-06-25 NOTE — PROGRESS NOTES
Observation goals  PRIOR TO DISCHARGE        Comments:   -vital signs normal or at patient baseline:Met   -evaluated by cardiology:Not met  Nurse to notify provider when observation goals have been met and patient is ready for discharge

## 2023-06-25 NOTE — DISCHARGE SUMMARY
[facility]  Hospitalist Discharge Summary      Date of Admission:  6/24/2023  Date of Discharge:  6/25/2023  Discharging Provider: Daxa Garland MD  Discharge Service: Hospitalist Service    Discharge Diagnoses     Chest pain/back pain -resolved   Pyuria     Clinically Significant Risk Factors          Follow-ups Needed After Discharge   Follow-up Appointments     Follow-up and recommended labs and tests       Follow up with primary care provider, Kiera Alatorre, within 7 days for   hospital follow- up.  No follow up labs or test are needed.        {Additional follow-up instructions/to-do's for PCP        Unresulted Labs Ordered in the Past 30 Days of this Admission     Date and Time Order Name Status Description    6/24/2023 12:39 PM Urine Culture In process       These results will be followed up by     Discharge Disposition   Discharged to home  Condition at discharge: Stable    Hospital Course     Brii Cook is a 72 year old female with past medical history of depression, anxiety, small RL L unprovoked pulmonary embolism diagnosed in 4/2023 for which she is on on chronic anticoagulation with DOAC and recent episode of chest pain attributed to scad who was admitted to observation status on 6/24/2023 for evaluation of chest pain.    Troponinx2 were negative , CXR was done which showed Benign calcified granuloma right upper lobe. Strand of fibrosis and/or linear atelectasis in the right middle lobe and lingula. Lungs are otherwise clear. No pleural effusion. Heart size and pulmonary vascularity within normal limits. Plate   and screw right clavicle. No significant change. crp was normal. Cardiology was consulted and echo was done which showed ef of 50-55%, moderate lateral wall hypokinesis and no valvular disease and wall motion severity mild improved as compared to prior .ECG shows improvement in the lateral ST elevations since 5/6. Patient was chest pain free on day of discharge  and was in agreement to go home. She had UA which showed LE and no wbc and had no symptoms and cultures have been negative. Seen by cards on day of discharge and patient symptoms have resolved and did not wanted lidocaine patch. Will follow with routing pcp visit . She was in agreement with going home     Consultations This Hospital Stay   CARDIOLOGY IP CONSULT  CARDIOLOGY IP CONSULT    Code Status   Full Code    Time Spent on this Encounter   I, Daxa Garland MD, personally saw the patient today and spent greater than 30 minutes discharging this patient.       Daxa Garland MD  [facility]  [address] [place] [id number]  Phone: [phone]  ______________________________________________________________________    Physical Exam   Vital Signs: Temp: 98.1  F (36.7  C) Temp src: Oral BP: 115/73 Pulse: 65   Resp: 17 SpO2: 95 % O2 Device: None (Room air)    Weight: 140 lbs 0 oz        General: aox3  Respiratory: ctla  Cardiovascular: Regular rate , S1 and S2 normal with no murmer or rubs or gallops  Neurologic:  No facial droop  Musculoskeletal: Normal Range of motion over upper and lower extremities bilaterally   Psychiatric: cooperative        Primary Care Physician   Kiera Alatorre    Discharge Orders      Reason for your hospital stay    Chest pain -resolved     Follow-up and recommended labs and tests     Follow up with primary care provider, Kiera Alatorre, within 7 days for hospital follow- up.  No follow up labs or test are needed.     Activity    Your activity upon discharge: activity as tolerated     Diet    Follow this diet upon discharge: Orders Placed This Encounter      Regular Diet Adult       Significant Results and Procedures   Most Recent 3 CBC's:Recent Labs   Lab Test 06/24/23  1123 05/08/23  0737 05/06/23  2349   WBC 3.9* 5.9 5.3   HGB 14.2 13.3 12.0   MCV 95 94 96    184 201     Most Recent 3 BMP's:Recent Labs   Lab Test 06/24/23  1123  23  0534 23  0737    140 139   POTASSIUM 4.0 3.9 3.8   CHLORIDE 109* 106 107   CO2 26 26 25   BUN 16.0 14.4 9.9   CR 0.84 0.73 0.75   ANIONGAP 7 8 7   DENNY 9.2 8.5* 8.6*   * 95 101*     Most Recent 2 LFT's:Recent Labs   Lab Test 23  2347 22  1148   AST 23 24   ALT 16 18   ALKPHOS 81 86   BILITOTAL <0.2 0.3   ,   Results for orders placed or performed during the hospital encounter of 23   XR Chest 2 Views    Narrative    EXAM: XR CHEST 2 VIEWS  LOCATION: [facility]  DATE: 2023    INDICATION: Chest pain.  COMPARISON: 2013 CT.      Impression    IMPRESSION: Benign calcified granuloma right upper lobe. Strand of fibrosis and/or linear atelectasis in the right middle lobe and lingula. Lungs are otherwise clear. No pleural effusion. Heart size and pulmonary vascularity within normal limits. Plate   and screw right clavicle. No significant change.     Echocardiogram Limited     Value    LVEF  50-55%    Narrative    [id number]  [id number]  [id number]  [id number]^MINI^TOM     [facility]  Echocardiography Laboratory  [address] [place] [address]     Name: PAYAL MOYA  MRN: [id number]  : 1950  Study Date: 2023 02:15 PM  Age: 72 yrs  Gender: Female  Patient Location: [facility]  Reason For Study: Abn EKG  Ordering Physician: TOM MORSE  Referring Physician: Kiera Alatorre  Performed By: Kevin Barger     BSA: 1.7 m2  Height: 65 in  Weight: 140 lb  HR: 57  BP: 151/82 mmHg  ______________________________________________________________________________  Procedure  Limited Echo Adult. Optison (NDC #[id number]) given intravenously.  ______________________________________________________________________________  Interpretation Summary     The visual ejection fraction is 50-55%.  There is moderate lateral wall hypokinesis.  No significant valvular heart disease.     Wall motion severity mildy improved  compared to previous echo 2023.  ______________________________________________________________________________  Left Ventricle  The left ventricle is normal in size. There is normal left ventricular wall  thickness. The visual ejection fraction is 50-55%. There is moderate lateral  wall hypokinesis.     Right Ventricle  The right ventricle is normal in size and function.     Atria  Normal left atrial size. Right atrial size is normal. There is no color  Doppler evidence of an atrial shunt.     Mitral Valve  The mitral valve leaflets are mildly thickened. There is trace mitral  regurgitation.     Tricuspid Valve  There is trace tricuspid regurgitation. The right ventricular systolic  pressure is approximated at 16.3 mmHg plus the right atrial pressure.     Aortic Valve  There is trivial trileaflet aortic sclerosis. No aortic regurgitation is  present.     Pulmonic Valve  There is trace pulmonic valvular regurgitation.     Vessels  Normal size aorta. The aortic root is normal size.     Pericardium  There is no pericardial effusion.     Rhythm  Sinus rhythm was noted.  ______________________________________________________________________________  MMode/2D Measurements & Calculations  IVSd: 0.94 cm     LVIDd: 4.9 cm  LVIDs: 2.1 cm  LVPWd: 0.89 cm  LVPWs: 5.0 cm  FS: 55.8 %  LV mass(C)d: 153.9 grams  LV mass(C)dI: 90.5 grams/m2  LA dimension: 2.8 cm  asc Aorta Diam: 2.9 cm  RWT: 0.37     Doppler Measurements & Calculations  TR max ian: 202.0 cm/sec  TR max P.3 mmHg     ______________________________________________________________________________  Report approved by: Rachel Dumont 2023 03:24 PM               Discharge Medications   Current Discharge Medication List      CONTINUE these medications which have NOT CHANGED    Details   buPROPion (WELLBUTRIN XL) 150 MG 24 hr tablet Take 150 mg by mouth daily Takes in afternoon/evening      MAGNESIUM PO Take 1 tablet by mouth daily Unknown Mg salt/tablet  strength      metoprolol tartrate (LOPRESSOR) 25 MG tablet Take 1 tablet (25 mg) by mouth 2 times daily  Qty: 60 tablet, Refills: 1    Associated Diagnoses: Spontaneous dissection of coronary artery      rivaroxaban ANTICOAGULANT (XARELTO) 20 MG TABS tablet Take 1 tablet (20 mg) by mouth daily (with dinner)           Allergies   Allergies   Allergen Reactions     Codeine Nausea and Vomiting     PN: LW Reaction: Vomiting  PN: LW Reaction: Vomiting       Ampicillin Rash     PN: LW Reaction: Rash, Generalized  PN: LW Reaction: Rash, Generalized       Cefaclor Rash     Has tolerated in the past 09/2022       Penicillin G Rash     Sulfa Antibiotics Unknown and Rash     PN: LW Reaction: Unknown Reaction       Sulfatolamide Rash

## 2023-06-25 NOTE — PROGRESS NOTES
MD Notification    Notified Person: MD    Notified Person Name:  Lesa DR. Huang    Notification Date/Time:  6/24/23 2006    Notification Interaction: Netsize messaging    Purpose of Notification: Pt UA abnormal. Any intervention please advise. UC pending. Thanks     Orders Received:  Will wait for UC result. No antibiotics for now.     Comments:

## 2023-06-25 NOTE — PLAN OF CARE
Goal Outcome Evaluation:      Plan of Care Reviewed With: patient    Overall Patient Progress: improvingOverall Patient Progress: improving    Orientation/Cognitive:A&Ox4  Observation Goals (Met/ Not Met): Not met  Mobility Level/Assist Equipment: IND  Fall Risk (Y/N): N  Behavior Concerns:N  Pain Management: Denies pain   Tele/VS/O2: Tele:SR, VSS  ABNL Lab/BG: WBC:3.9  Diet: Regular  Bowel/Bladder: Continent  Skin Concerns:WDL  Drains/Devices: PIV:S/L  Tests/Procedures for next shift: Cardiology consult  Anticipated DC date & active delays: 1-2 days  Patient Stated Goal for Today: To sleep   Observation goals  PRIOR TO DISCHARGE        Comments:   -vital signs normal or at patient baseline:Met   -evaluated by cardiology:Not met  Nurse to notify provider when observation goals have been met and patient is ready for discharge

## 2023-06-25 NOTE — PLAN OF CARE
Orientation/Cognitive: A&Ox4  Observation Goals (Met/ Not Met): met  Mobility Level/Assist Equipment: Ibnd  Fall Risk (Y/N): no  Behavior Concerns: none  Pain Management: denies pain  Tele/VS/O2: Tele: NSR. VSS. RA.   ABNL Lab/BG: see chart  Diet: tolerating regular diet  Bowel/Bladder: continent.  Skin Concerns: none  Drains/Devices: PIV removed  Tests/Procedures for next shift: none  Anticipated DC date & active delays: today  Patient Stated Goal for Today: discharge home  Discharge instructions reviewed with pt and spouse, no questions, no new meds.

## 2023-06-25 NOTE — PROGRESS NOTES
Orientation/Cognitive: AOX4  Observation Goals (Met/ Not Met): not met  Mobility Level/Assist Equipment: Ind  Fall Risk (Y/N): no  Behavior Concerns: none  Pain Management: denies any pain/CP. Lidocaine patch at mid lower back.  Tele/VS/O2: VSS on RA. Tele- SR w/BBB.  ABNL Lab/BG: UA- abnormal.  Diet: Reg  Bowel/Bladder: Continent.   Skin Concerns: None  Drains/Devices: PIV SL  Tests/Procedures for next shift: Cardiology consult pending.  Anticipated DC date & active delays: 6/25  Patient Stated Goal for Today: none    Observation goals  PRIOR TO DISCHARGE        Comments: -vital signs normal or at patient baseline - met  -evaluated by cardiology - not met  Nurse to notify provider when observation goals have been met and patient is ready for discharge.

## 2023-06-25 NOTE — PROGRESS NOTES
[facility]    Cardiology Progress     Date of Admission:  6/24/2023  Reason for Consult:  CP, hx SCAD        Assessment & Plan  1. CP, resolved  2. Hx SCAD Lcx/OM 6/7/23  3. Recent small PE on Xarelto  4. Normal LV function, EF 55-60%, mild inferolateral to apical HK, mild LVH     Plan  Dismiss today with routine follow up. Echocardiogram showing improvement of RWMA since last study and preserved EF. Provided reassurance.   Plan to follow up with Vascular clinic in July.     Singh Ramesh M.D.    Interval History/Subjective:    No acute events. CP free.         Physical Exam   Temp: 98.1  F (36.7  C) Temp src: Oral BP: 115/73 Pulse: 65   Resp: 17 SpO2: 95 % O2 Device: None (Room air)    Vital Signs with Ranges  Temp:  [97.5  F (36.4  C)-98.7  F (37.1  C)] 98.1  F (36.7  C)  Pulse:  [59-77] 65  Resp:  [15-18] 17  BP: (101-151)/(59-82) 115/73  SpO2:  [94 %-96 %] 95 %  140 lbs 0 oz    Constitutional: Awake, alert, cooperative, no apparent distress.  Respiratory: Clear to auscultation bilaterally, no crackles or wheezing.  Neck: No JVD  Cardiovascular: Regular rate and rhythm, normal S1 and S2, and no murmur noted.  Extremities: No lower or upper extremity edema   Vascular: Radial pulses 4+ and symmetric bilaterally    Data   Most Recent 3 CBC's:  Recent Labs   Lab Test 06/24/23  1123 05/08/23  0737 05/06/23  2349   WBC 3.9* 5.9 5.3   HGB 14.2 13.3 12.0   MCV 95 94 96    184 201     Most Recent 3 BMP's:  Recent Labs   Lab Test 06/24/23  1123 05/09/23  0534 05/08/23  0737    140 139   POTASSIUM 4.0 3.9 3.8   CHLORIDE 109* 106 107   CO2 26 26 25   BUN 16.0 14.4 9.9   CR 0.84 0.73 0.75   ANIONGAP 7 8 7   DENNY 9.2 8.5* 8.6*   * 95 101*     Most Recent 3 Troponin's:No lab results found.  Most Recent 3 BNP's:  Recent Labs   Lab Test 05/07/23  0418   NTBNPI 345     Most Recent Cholesterol Panel:  Recent Labs   Lab Test 05/07/23 0418   CHOL 169   LDL 91   HDL 73   TRIG 26

## 2023-06-25 NOTE — PROGRESS NOTES
Observation goals  PRIOR TO DISCHARGE        Comments:   -vital signs normal or at patient baseline:Met   -evaluated by cardiology:Met  Nurse to notify provider when observation goals have been met and patient is ready for discharge      discharge home this morning

## 2023-06-26 ENCOUNTER — PATIENT OUTREACH (OUTPATIENT)
Dept: CARE COORDINATION | Facility: CLINIC | Age: 73
End: 2023-06-26
Payer: COMMERCIAL

## 2023-06-26 NOTE — PROGRESS NOTES
"Clinic Care Coordination Contact  [facility]: Post-Discharge Note  SITUATION                                                      Admission:    Admission Date: 06/24/23   Reason for Admission: Chest pain/back pain -resolved   Pyuria  Discharge:   Discharge Date: 06/25/23  Discharge Diagnosis: Chest pain/back pain -resolved   Pyuria    BACKGROUND                                                      Per hospital discharge summary and inpatient provider notes:    Brii Cook is a 72 year old female with past medical history of depression, anxiety, small RL L unprovoked pulmonary embolism diagnosed in 4/2023 for which she is on on chronic anticoagulation with DOAC and recent episode of chest pain attributed to scad who was admitted to observation status on 6/24/2023 for evaluation of chest pain.     Troponinx2 were negative , CXR was done which showed Benign calcified granuloma right upper lobe. Strand of fibrosis and/or linear atelectasis in the right middle lobe and lingula. Lungs are otherwise clear. No pleural effusion. Heart size and pulmonary vascularity within normal limits. Plate   and screw right clavicle. No significant change. crp was normal. Cardiology was consulted and echo was done which showed ef of 50-55%, moderate lateral wall hypokinesis and no valvular disease and wall motion severity mild improved as compared to prior .ECG shows improvement in the lateral ST elevations since 5/6. Patient was chest pain free on day of discharge and was in agreement to go home. She had UA which showed LE and no wbc and had no symptoms and cultures have been negative. Seen by cards on day of discharge and patient symptoms have resolved and did not wanted lidocaine patch. Will follow with routing pcp visit . She was in agreement with going home.    ASSESSMENT      Discharge Assessment  How are you doing now that you are home?: \"Doing okay\"  How are your symptoms? (Red Flag symptoms escalate to triage " hotline per guidelines): Improved  Do you feel your condition is stable enough to be safe at home until your provider visit?: Yes  Does the patient have their discharge instructions? : Yes  Does the patient have questions regarding their discharge instructions? : No  Were you started on any new medications or were there changes to any of your previous medications? : No  Does the patient have all of their medications?: Yes  Do you have questions regarding any of your medications? : No  Do you have all of your needed medical supplies or equipment (DME)?  (i.e. oxygen tank, CPAP, cane, etc.): Yes  Discharge follow-up appointment scheduled within 14 calendar days? : No  Is patient agreeable to assistance with scheduling? : No    Post-op ([facility] CTA Only)  If the patient had a surgery or procedure, do they have any questions for a nurse?: No    PLAN                                                      Outpatient Plan:    Follow up with primary care provider, Kiera Alatorre, within 7 days for hospital follow- up.  No follow up labs or test are needed.      No future appointments.      For any urgent concerns, please contact our 24 hour nurse triage line: [phone] ([phone])         RYLAN Watts  [phone]  [facility]

## 2023-06-27 LAB — BACTERIA UR CULT: NORMAL

## 2023-11-15 ENCOUNTER — HOSPITAL ENCOUNTER (EMERGENCY)
Facility: CLINIC | Age: 73
Discharge: HOME | End: 2023-11-15
Attending: STUDENT IN AN ORGANIZED HEALTH CARE EDUCATION/TRAINING PROGRAM | Admitting: STUDENT IN AN ORGANIZED HEALTH CARE EDUCATION/TRAINING PROGRAM
Payer: COMMERCIAL

## 2023-11-15 ENCOUNTER — APPOINTMENT (OUTPATIENT)
Dept: GENERAL RADIOLOGY | Facility: CLINIC | Age: 73
End: 2023-11-15
Attending: STUDENT IN AN ORGANIZED HEALTH CARE EDUCATION/TRAINING PROGRAM
Payer: COMMERCIAL

## 2023-11-15 VITALS
HEIGHT: 65 IN | HEART RATE: 73 BPM | RESPIRATION RATE: 17 BRPM | WEIGHT: 140 LBS | OXYGEN SATURATION: 94 % | DIASTOLIC BLOOD PRESSURE: 88 MMHG | TEMPERATURE: 98.1 F | SYSTOLIC BLOOD PRESSURE: 144 MMHG | BODY MASS INDEX: 23.32 KG/M2

## 2023-11-15 DIAGNOSIS — R07.9 CHEST PAIN, UNSPECIFIED TYPE: ICD-10-CM

## 2023-11-15 LAB
ANION GAP SERPL CALCULATED.3IONS-SCNC: 8 MMOL/L (ref 7–15)
ATRIAL RATE - MUSE: 70 BPM
BASOPHILS # BLD AUTO: 0 10E3/UL (ref 0–0.2)
BASOPHILS NFR BLD AUTO: 0 %
BUN SERPL-MCNC: 16.6 MG/DL (ref 8–23)
CALCIUM SERPL-MCNC: 8.8 MG/DL (ref 8.8–10.2)
CHLORIDE SERPL-SCNC: 105 MMOL/L (ref 98–107)
CREAT SERPL-MCNC: 0.85 MG/DL (ref 0.51–0.95)
DEPRECATED HCO3 PLAS-SCNC: 26 MMOL/L (ref 22–29)
DIASTOLIC BLOOD PRESSURE - MUSE: NORMAL MMHG
EGFRCR SERPLBLD CKD-EPI 2021: 71 ML/MIN/1.73M2
EOSINOPHIL # BLD AUTO: 0.2 10E3/UL (ref 0–0.7)
EOSINOPHIL NFR BLD AUTO: 4 %
ERYTHROCYTE [DISTWIDTH] IN BLOOD BY AUTOMATED COUNT: 13.2 % (ref 10–15)
GLUCOSE SERPL-MCNC: 104 MG/DL (ref 70–99)
HCT VFR BLD AUTO: 40.8 % (ref 35–47)
HGB BLD-MCNC: 13.4 G/DL (ref 11.7–15.7)
HOLD SPECIMEN: NORMAL
IMM GRANULOCYTES # BLD: 0 10E3/UL
IMM GRANULOCYTES NFR BLD: 0 %
INTERPRETATION ECG - MUSE: NORMAL
LYMPHOCYTES # BLD AUTO: 1.3 10E3/UL (ref 0.8–5.3)
LYMPHOCYTES NFR BLD AUTO: 33 %
MCH RBC QN AUTO: 31.6 PG (ref 26.5–33)
MCHC RBC AUTO-ENTMCNC: 32.8 G/DL (ref 31.5–36.5)
MCV RBC AUTO: 96 FL (ref 78–100)
MONOCYTES # BLD AUTO: 0.4 10E3/UL (ref 0–1.3)
MONOCYTES NFR BLD AUTO: 11 %
NEUTROPHILS # BLD AUTO: 2 10E3/UL (ref 1.6–8.3)
NEUTROPHILS NFR BLD AUTO: 52 %
NRBC # BLD AUTO: 0 10E3/UL
NRBC BLD AUTO-RTO: 0 /100
P AXIS - MUSE: 21 DEGREES
PLATELET # BLD AUTO: 199 10E3/UL (ref 150–450)
POTASSIUM SERPL-SCNC: 4 MMOL/L (ref 3.4–5.3)
PR INTERVAL - MUSE: 138 MS
QRS DURATION - MUSE: 78 MS
QT - MUSE: 388 MS
QTC - MUSE: 419 MS
R AXIS - MUSE: 47 DEGREES
RBC # BLD AUTO: 4.24 10E6/UL (ref 3.8–5.2)
SODIUM SERPL-SCNC: 139 MMOL/L (ref 135–145)
SYSTOLIC BLOOD PRESSURE - MUSE: NORMAL MMHG
T AXIS - MUSE: 27 DEGREES
TROPONIN T SERPL HS-MCNC: <6 NG/L
VENTRICULAR RATE- MUSE: 70 BPM
WBC # BLD AUTO: 3.9 10E3/UL (ref 4–11)

## 2023-11-15 PROCEDURE — 99285 EMERGENCY DEPT VISIT HI MDM: CPT | Mod: 25

## 2023-11-15 PROCEDURE — 85041 AUTOMATED RBC COUNT: CPT | Performed by: STUDENT IN AN ORGANIZED HEALTH CARE EDUCATION/TRAINING PROGRAM

## 2023-11-15 PROCEDURE — 93005 ELECTROCARDIOGRAM TRACING: CPT

## 2023-11-15 PROCEDURE — 85014 HEMATOCRIT: CPT | Performed by: STUDENT IN AN ORGANIZED HEALTH CARE EDUCATION/TRAINING PROGRAM

## 2023-11-15 PROCEDURE — 71046 X-RAY EXAM CHEST 2 VIEWS: CPT

## 2023-11-15 PROCEDURE — 82310 ASSAY OF CALCIUM: CPT | Performed by: STUDENT IN AN ORGANIZED HEALTH CARE EDUCATION/TRAINING PROGRAM

## 2023-11-15 PROCEDURE — 84484 ASSAY OF TROPONIN QUANT: CPT | Performed by: STUDENT IN AN ORGANIZED HEALTH CARE EDUCATION/TRAINING PROGRAM

## 2023-11-15 PROCEDURE — 36415 COLL VENOUS BLD VENIPUNCTURE: CPT | Performed by: STUDENT IN AN ORGANIZED HEALTH CARE EDUCATION/TRAINING PROGRAM

## 2023-11-15 NOTE — DISCHARGE INSTRUCTIONS
Please follow-up with your cardiologist this week as they may recommend further evaluation.  Thankfully your labs and imaging were all normal and reassuring today.  As your pain was reproducible with palpation, it may be musculoskeletal.  If so, encourage you to try heat to the area along with Tylenol to see if this resolves symptoms.  If symptoms worsen, return to ER.

## 2023-11-15 NOTE — ED PROVIDER NOTES
ED ATTENDING PHYSICIAN NOTE:   I evaluated this patient in conjunction with Lay Casey PA-C  I have participated in the care of the patient and personally performed key elements of the history, exam, and medical decision making.      HPI:   Brii Cook is a 73 year old female with history of STEMI, PE, anticoagulated on Xarelto, who presents with left-sided chest pain.  Patient states that she woke up at 745 this morning due to severe chest pain in her left breast.  Pain has been constant since.  Denies any other associated symptoms outside of possible chills throughout the morning.  Denies any radiation to upper extremities or back.  Patient reports that she has history of a STEMI earlier this year and presented with right upper back pain.  States that this does not feel anything similar to that.  She denies any shortness of breath or worsening pain with inspiration.  She has been taking her Xarelto as prescribed.  She denies any strenuous exercises or lifting heavy as she has a current lifting restriction of no more than 20 pounds.    Independent Historian:   Spouse/Partner - They report as above    Review of External Notes: None     EXAM:   Constitutional: Vital signs reviewed as above  General: Alert, pleasant  HEENT: Moist mucous membranes  Eyes: Pupils are equal, round, and reactive to light.   Neck: Normal range of motion  Cardiovascular: normal rate, Regular rhythm and normal heart sounds.  No MRG  Pulmonary/Chest: Effort normal and breath sounds normal. No respiratory distress. Patient has no wheezes. Patient has no rales.  No chest wall tenderness  Gastrointestinal: Soft. Positive bowel sounds. No MRG.  Musculoskeletal/Extremities: Full ROM.  No calf swelling or tenderness.  Endo: No pitting edema  Neurological: Alert, no focal deficits.  Skin: Skin is warm and dry.   Psychiatric: Pleasant     Independent Interpretation (X-rays, CTs, rhythm strip):  EKG shows sinus rhythm, rate of 70, no acute  concerning ST or T wave changes.  Chest x-ray is negative for any signs of pneumothorax, pneumonia, effusion or dissection.    Consultations/Discussion of Management or Tests:  None    Social Determinants of Health affecting care:   None     MEDICAL DECISION MAKING/ASSESSMENT AND PLAN:   Patient is a very pleasant 73-year-old female with a history of MI, currently anticoagulated on Xarelto for PE who presents with chest discomfort in the left axilla as detailed above.  This is different from her heart attack in which there was severe pain in her right shoulder blade area.  She denies recent illnesses.  Her EKG here is nondiagnostic.  Her troponin is undetectable.  Her story is not consistent with acute coronary syndrome.  She does not have any discomfort right now.  She certainly not hypoxic or tachycardic.  Again she is anticoagulated and I do not think this represents PE either.  Patient was relieved to hear this news as she was starting to worry again about her heart.  She will be discharged home.  Follow-up if symptoms worsen or return.     DIAGNOSIS:     ICD-10-CM    1. Chest pain, unspecified type  R07.9                DISPOSITION:   Home     Rocky Knox MD  11/15/2023  [facility] EMERGENCY DEPT      Rocky Knox MD  11/15/23 1529

## 2023-11-15 NOTE — ED NOTES
Bed: ED07  Expected date:   Expected time:   Means of arrival:   Comments:  Quentin - 73 F chest pain eta 1338

## 2023-11-15 NOTE — ED PROVIDER NOTES
History     Chief Complaint:  Chest Pain       HPI   Brii Cook is a 73 year old female with history of STEMI, PE, anticoagulated on Xarelto, who presents with left-sided chest pain.  Patient states that she woke up at 745 this morning due to severe chest pain in her left breast.  Pain has been constant since.  Denies any other associated symptoms outside of possible chills throughout the morning.  Denies any radiation to upper extremities or back.  Patient reports that she has history of a STEMI earlier this year and presented with right upper back pain.  States that this does not feel anything similar to that.  She denies any shortness of breath or worsening pain with inspiration.  She has been taking her Xarelto as prescribed.  She denies any strenuous exercises or lifting heavy as she has a current lifting restriction of no more than 20 pounds.      Independent Historian:   None - Patient Only    Review of External Notes:   Reviewed discharge summary from 6/25/2023 -observation admission for chest pain  Echo completed 6/24/23 - EF of 50 to 55%, moderate lateral wall hypokinesis and no valvular disease  Echo completed 5/8/2023, ejection fraction of 55 to 60% with normal LV function  Discharge summary from 5/9/2023 -EKG with ST elevation in V4 through V6, taken to Cath Lab for left heart cath which showed spontaneous dissection of the first obtuse marginal branch of the left circumflex, experienced a run of sustained monomorphic ventricular tachycardia that resolved without intervention    Medications:    buPROPion (WELLBUTRIN XL) 150 MG 24 hr tablet  MAGNESIUM PO  metoprolol tartrate (LOPRESSOR) 25 MG tablet  rivaroxaban ANTICOAGULANT (XARELTO) 20 MG TABS tablet        Past Medical History:    Past Medical History:   Diagnosis Date    Depression     Pulmonary embolism (H)        Past Surgical History:    Past Surgical History:   Procedure Laterality Date    CV CORONARY ANGIOGRAM N/A 5/7/2023     "Procedure: Coronary Angiogram;  Surgeon: Lily Darling MD;  Location:  HEART CARDIAC CATH LAB    CV LEFT HEART CATH N/A 5/7/2023    Procedure: Left Heart Catheterization;  Surgeon: Lily Darling MD;  Location:  HEART CARDIAC CATH LAB    NO HISTORY OF SURGERY          Physical Exam   Patient Vitals for the past 24 hrs:   BP Temp Temp src Pulse Resp SpO2 Height Weight   11/15/23 1350 -- 98.1  F (36.7  C) Oral -- -- 97 % 1.651 m (5' 5\") 63.5 kg (140 lb)   11/15/23 1346 (!) 157/94 -- -- 71 12 -- -- --        Physical Exam  General: Alert and cooperative with exam. Patient in no apparent distress. Normal mentation.  Head:  Scalp is NC/AT  Eyes:  No scleral icterus, PERRL  ENT:  The external nose and ears are normal.   Neck:  Normal range of motion without rigidity.  CV:  Regular rate and rhythm    No pathologic murmur     Left breast tenderness to palpation along the lateral chest wall  Resp:  Breath sounds are clear bilaterally    Non-labored, no retractions or accessory muscle use  GI:  Abdomen is soft, no distension, no tenderness. No peritoneal signs  MS:  No lower extremity edema   Skin:  Warm and dry, No rash or lesions noted.  Neuro:  Oriented x 3. No gross motor deficits.        Emergency Department Course   ECG  ECG taken at 1341, ECG read at 1356  NSR, low voltage QRS, Nonspecific ST abnormality   No significant changes when  compared to prior, dated 6/24/23.  Rate 70 bpm. RI interval 138 ms. QRS duration 78 ms. QT/QTc 388/419 ms. P-R-T axes 21 47 27.     Imaging:  Chest XR,  PA & LAT   Preliminary Result   IMPRESSION: No acute cardiopulmonary disease. Stable postoperative   change at the right clavicle. Stable calcified granuloma at the right   lung.             Laboratory:  Labs Ordered and Resulted from Time of ED Arrival to Time of ED Departure   BASIC METABOLIC PANEL - Abnormal       Result Value    Sodium 139      Potassium 4.0      Chloride 105      Carbon Dioxide (CO2) 26      Anion Gap 8   "    Urea Nitrogen 16.6      Creatinine 0.85      GFR Estimate 71      Calcium 8.8      Glucose 104 (*)    CBC WITH PLATELETS AND DIFFERENTIAL - Abnormal    WBC Count 3.9 (*)     RBC Count 4.24      Hemoglobin 13.4      Hematocrit 40.8      MCV 96      MCH 31.6      MCHC 32.8      RDW 13.2      Platelet Count 199      % Neutrophils 52      % Lymphocytes 33      % Monocytes 11      % Eosinophils 4      % Basophils 0      % Immature Granulocytes 0      NRBCs per 100 WBC 0      Absolute Neutrophils 2.0      Absolute Lymphocytes 1.3      Absolute Monocytes 0.4      Absolute Eosinophils 0.2      Absolute Basophils 0.0      Absolute Immature Granulocytes 0.0      Absolute NRBCs 0.0     TROPONIN T, HIGH SENSITIVITY - Normal    Troponin T, High Sensitivity <6          Procedures   None    Emergency Department Course & Assessments:    Interventions:  Medications - No data to display       Independent Interpretation (X-rays, CTs, rhythm strip):  CXR - no opacities, effusions, widened mediastinum    Consultations/Discussion of Management or Tests:  None        Social Determinants of Health affecting care:   None    Disposition:  The patient was discharged to home.     Impression & Plan      Medical Decision Making:  Brii is a 73-year-old female who presents with left-sided chest pain, onset this morning.  Pain is not exertional in nature.  No shortness of breath or pleuritic component.  On exam, patient's heart rate is normal, breathing comfortably on room air.  She does have reproducible pain with left lateral chest wall palpation.  EKG was completed with normal sinus rhythm, unchanged from previous.  Her troponin is negative after 6 hours of discomfort.  Delta troponin not indicated at this time as low suspicion for cardiac cause of her symptoms.  She is anticoagulated on Xarelto, has not missed any doses, low suspicion for PE.  As her pain is reproducible, suspect musculoskeletal injury however given patient's significant  cardiac history, recommend she follow-up with her outpatient cardiologist within the week.  They may recommend further work-up at that time.  Continue Tylenol for symptomatic relief at home.  If symptoms worsen, return to ER.      Diagnosis:    ICD-10-CM    1. Chest pain, unspecified type  R07.9            Discharge Medications:  New Prescriptions    No medications on file          11/15/2023   Lay Casey, Lay Negro PA-C  11/15/23 1444

## 2023-11-15 NOTE — ED TRIAGE NOTES
Patient BIBA from home. Early this morning patient began to have pain to the left side of her chest near her breast. 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    Patient taking Xarelto.

## 2023-11-15 NOTE — ED TRIAGE NOTES
Triage Assessment (Adult)       Row Name 11/15/23 1348          Triage Assessment    Airway WDL WDL        Respiratory WDL    Respiratory WDL WDL        Skin Circulation/Temperature WDL    Skin Circulation/Temperature WDL WDL        Cardiac WDL    Cardiac WDL X  left sided chest pain        Peripheral/Neurovascular WDL    Peripheral Neurovascular WDL WDL        Cognitive/Neuro/Behavioral WDL    Cognitive/Neuro/Behavioral WDL WDL

## 2023-12-26 ENCOUNTER — HOSPITAL ENCOUNTER (EMERGENCY)
Facility: CLINIC | Age: 73
Discharge: HOME | End: 2023-12-26
Attending: EMERGENCY MEDICINE | Admitting: EMERGENCY MEDICINE
Payer: COMMERCIAL

## 2023-12-26 ENCOUNTER — APPOINTMENT (OUTPATIENT)
Dept: GENERAL RADIOLOGY | Facility: CLINIC | Age: 73
End: 2023-12-26
Attending: EMERGENCY MEDICINE
Payer: COMMERCIAL

## 2023-12-26 VITALS
BODY MASS INDEX: 23.32 KG/M2 | OXYGEN SATURATION: 97 % | RESPIRATION RATE: 20 BRPM | HEART RATE: 87 BPM | DIASTOLIC BLOOD PRESSURE: 96 MMHG | WEIGHT: 140 LBS | TEMPERATURE: 98.3 F | HEIGHT: 65 IN | SYSTOLIC BLOOD PRESSURE: 134 MMHG

## 2023-12-26 DIAGNOSIS — R07.9 CHEST PAIN, UNSPECIFIED TYPE: ICD-10-CM

## 2023-12-26 LAB
ANION GAP SERPL CALCULATED.3IONS-SCNC: 8 MMOL/L (ref 7–15)
ATRIAL RATE - MUSE: 78 BPM
BASOPHILS # BLD AUTO: 0 10E3/UL (ref 0–0.2)
BASOPHILS NFR BLD AUTO: 0 %
BUN SERPL-MCNC: 14.6 MG/DL (ref 8–23)
CALCIUM SERPL-MCNC: 8.4 MG/DL (ref 8.8–10.2)
CHLORIDE SERPL-SCNC: 110 MMOL/L (ref 98–107)
CREAT SERPL-MCNC: 0.85 MG/DL (ref 0.51–0.95)
DEPRECATED HCO3 PLAS-SCNC: 26 MMOL/L (ref 22–29)
DIASTOLIC BLOOD PRESSURE - MUSE: NORMAL MMHG
EGFRCR SERPLBLD CKD-EPI 2021: 71 ML/MIN/1.73M2
EOSINOPHIL # BLD AUTO: 0.2 10E3/UL (ref 0–0.7)
EOSINOPHIL NFR BLD AUTO: 4 %
ERYTHROCYTE [DISTWIDTH] IN BLOOD BY AUTOMATED COUNT: 12.8 % (ref 10–15)
GLUCOSE SERPL-MCNC: 138 MG/DL (ref 70–99)
HCT VFR BLD AUTO: 39.8 % (ref 35–47)
HGB BLD-MCNC: 13.4 G/DL (ref 11.7–15.7)
HOLD SPECIMEN: NORMAL
IMM GRANULOCYTES # BLD: 0 10E3/UL
IMM GRANULOCYTES NFR BLD: 0 %
INTERPRETATION ECG - MUSE: NORMAL
LYMPHOCYTES # BLD AUTO: 1.5 10E3/UL (ref 0.8–5.3)
LYMPHOCYTES NFR BLD AUTO: 35 %
MCH RBC QN AUTO: 32.1 PG (ref 26.5–33)
MCHC RBC AUTO-ENTMCNC: 33.7 G/DL (ref 31.5–36.5)
MCV RBC AUTO: 95 FL (ref 78–100)
MONOCYTES # BLD AUTO: 0.5 10E3/UL (ref 0–1.3)
MONOCYTES NFR BLD AUTO: 11 %
NEUTROPHILS # BLD AUTO: 2.1 10E3/UL (ref 1.6–8.3)
NEUTROPHILS NFR BLD AUTO: 50 %
NRBC # BLD AUTO: 0 10E3/UL
NRBC BLD AUTO-RTO: 0 /100
P AXIS - MUSE: 47 DEGREES
PLATELET # BLD AUTO: 184 10E3/UL (ref 150–450)
POTASSIUM SERPL-SCNC: 3.3 MMOL/L (ref 3.4–5.3)
PR INTERVAL - MUSE: 172 MS
QRS DURATION - MUSE: 78 MS
QT - MUSE: 388 MS
QTC - MUSE: 442 MS
R AXIS - MUSE: 26 DEGREES
RBC # BLD AUTO: 4.18 10E6/UL (ref 3.8–5.2)
SODIUM SERPL-SCNC: 144 MMOL/L (ref 135–145)
SYSTOLIC BLOOD PRESSURE - MUSE: NORMAL MMHG
T AXIS - MUSE: 44 DEGREES
TROPONIN T SERPL HS-MCNC: <6 NG/L
VENTRICULAR RATE- MUSE: 78 BPM
WBC # BLD AUTO: 4.2 10E3/UL (ref 4–11)

## 2023-12-26 PROCEDURE — 93005 ELECTROCARDIOGRAM TRACING: CPT

## 2023-12-26 PROCEDURE — 71046 X-RAY EXAM CHEST 2 VIEWS: CPT

## 2023-12-26 PROCEDURE — 36415 COLL VENOUS BLD VENIPUNCTURE: CPT | Performed by: EMERGENCY MEDICINE

## 2023-12-26 PROCEDURE — 85025 COMPLETE CBC W/AUTO DIFF WBC: CPT | Performed by: EMERGENCY MEDICINE

## 2023-12-26 PROCEDURE — 99285 EMERGENCY DEPT VISIT HI MDM: CPT | Mod: 25

## 2023-12-26 PROCEDURE — 80048 BASIC METABOLIC PNL TOTAL CA: CPT | Performed by: EMERGENCY MEDICINE

## 2023-12-26 PROCEDURE — 84484 ASSAY OF TROPONIN QUANT: CPT | Performed by: EMERGENCY MEDICINE

## 2023-12-26 RX ORDER — DIAZEPAM 5 MG
5 TABLET ORAL EVERY 6 HOURS PRN
Qty: 20 TABLET | Refills: 0 | Status: SHIPPED | OUTPATIENT
Start: 2023-12-26 | End: 2024-01-02

## 2023-12-26 ASSESSMENT — ACTIVITIES OF DAILY LIVING (ADL): ADLS_ACUITY_SCORE: 35

## 2023-12-26 NOTE — ED PROVIDER NOTES
"    History     Chief Complaint:  Chest Pain       HPI   Brii Cook is a 73 year old female who presents to the ED with posterior right chest and shoulder pain which has been intermittent over the past 24 hours.  She is concerned because she had a spontaneous dissection of a coronary artery earlier this year with similar pain.  She also prior to that had suffered a pulmonary embolus and was placed on Xarelto.  In November of this year she came in with similar pain and was able to be discharged home.  She denies sweats shortness of breath nausea or radiation of pain.  She has no risk factors      Independent Historian:    Patient    Review of External Notes:  None    Medications:    diazepam (VALIUM) 5 MG tablet  buPROPion (WELLBUTRIN XL) 150 MG 24 hr tablet  MAGNESIUM PO  metoprolol tartrate (LOPRESSOR) 25 MG tablet  rivaroxaban ANTICOAGULANT (XARELTO) 20 MG TABS tablet        Past Medical History:    Past Medical History:   Diagnosis Date    Depression     Pulmonary embolism (H)        Past Surgical History:    Past Surgical History:   Procedure Laterality Date    CV CORONARY ANGIOGRAM N/A 5/7/2023    Procedure: Coronary Angiogram;  Surgeon: Lily Darling MD;  Location:  HEART CARDIAC CATH LAB    CV LEFT HEART CATH N/A 5/7/2023    Procedure: Left Heart Catheterization;  Surgeon: Lily Darling MD;  Location:  HEART CARDIAC CATH LAB    NO HISTORY OF SURGERY            Physical Exam   Patient Vitals for the past 24 hrs:   BP Temp Temp src Pulse Resp SpO2 Height Weight   12/26/23 1604 (!) 134/96 -- -- -- -- -- -- --   12/26/23 1600 -- 98.3  F (36.8  C) Temporal 87 20 97 % 1.651 m (5' 5\") 63.5 kg (140 lb)        Physical Exam  Constitutional: Elderly white female supine no respiratory distress  HENT: No signs of trauma.   Eyes: EOM are normal. Pupils are equal, round, and reactive to light.   Neck: Normal range of motion. No JVD present. No cervical adenopathy.  Cardiovascular: Regular rhythm.  " Exam reveals no gallop and no friction rub.    No murmur heard.  Pulmonary/Chest: Bilateral breath sounds normal. No wheezes, rhonchi or rales.  No tenderness on palpation.  Metal plate from right clavicle repair easily seen under skin.  Abdominal: Soft. No tenderness. No rebound or guarding.   Musculoskeletal: No edema. No tenderness.   Lymphadenopathy: No lymphadenopathy.   Neurological: Alert and oriented to person, place, and time. Normal strength. Coordination normal.   Skin: Skin is warm and dry. No rash noted. No erythema.      Emergency Department Course   ECG  Normal sinus rhythm with occasional PVC nonspecific ST depression unchanged from EKG 11/15/2023  Rate 78 bpm. WI interval 172 ms. QRS duration 78 ms. QT/QTc 388/442 ms. P-R-T axes 47 2644.    Imaging:  Chest XR,  PA & LAT   Final Result   IMPRESSION: Heart size is normal. Calcified right upper lobe granuloma redemonstrated. Mild left basilar atelectasis or scarring, unchanged. No overt failure, lobar consolidation, or effusion. Right clavicular ORIF.        Report per radiology    Laboratory:  Labs Ordered and Resulted from Time of ED Arrival to Time of ED Departure   BASIC METABOLIC PANEL - Abnormal       Result Value    Sodium 144      Potassium 3.3 (*)     Chloride 110 (*)     Carbon Dioxide (CO2) 26      Anion Gap 8      Urea Nitrogen 14.6      Creatinine 0.85      GFR Estimate 71      Calcium 8.4 (*)     Glucose 138 (*)    TROPONIN T, HIGH SENSITIVITY - Normal    Troponin T, High Sensitivity <6     CBC WITH PLATELETS AND DIFFERENTIAL    WBC Count 4.2      RBC Count 4.18      Hemoglobin 13.4      Hematocrit 39.8      MCV 95      MCH 32.1      MCHC 33.7      RDW 12.8      Platelet Count 184      % Neutrophils 50      % Lymphocytes 35      % Monocytes 11      % Eosinophils 4      % Basophils 0      % Immature Granulocytes 0      NRBCs per 100 WBC 0      Absolute Neutrophils 2.1      Absolute Lymphocytes 1.5      Absolute Monocytes 0.5      Absolute  Eosinophils 0.2      Absolute Basophils 0.0      Absolute Immature Granulocytes 0.0      Absolute NRBCs 0.0          Procedures   None    Emergency Department Course & Assessments:             Interventions:  Medications - No data to display     Assessments:  Examined    Independent Interpretation (X-rays, CTs, rhythm strip):  Chest x-ray reviewed    Consultations/Discussion of Management or Tests:  None       Social Determinants of Health affecting care:  None     Disposition:  Discharge    Impression & Plan        Medical Decision Making:  This 73-year-old woman presents to the ED with right posterior shoulder pain similar to when she had a spontaneous coronary artery dissection earlier this year.  She denies any other associated symptoms.  Her EKG shows no acute ischemic changes.  Her troponin is normal chest x-ray is normal and other labs are unremarkable.  She thought maybe she is having some muscle spasm due to her clavicular plate.  I will prescribe her Valium to use at home as needed.  She will make a follow-up with her primary and if symptoms change or worsen recheck in the ED.  This pain is most likely musculoskeletal.        Diagnosis:    ICD-10-CM    1. Chest pain, unspecified type  R07.9            Discharge Medications:  Discharge Medication List as of 12/26/2023  5:31 PM        START taking these medications    Details   diazepam (VALIUM) 5 MG tablet Take 1 tablet (5 mg) by mouth every 6 hours as needed for anxiety (MUSCLE SPASM), Disp-20 tablet, R-0, Local Print                Joseph Florian MD  12/26/2023   No att. providers found              Joseph Florian MD  12/26/23 6739

## 2023-12-26 NOTE — ED TRIAGE NOTES
Chest and back pain since last night, currently no pain, last pain was at 2pm b4 calling ems, hx of PE and MI     Triage Assessment (Adult)       Row Name 12/26/23 0955          Triage Assessment    Airway WDL WDL        Respiratory WDL    Respiratory WDL WDL        Cardiac WDL    Cardiac WDL chest pain        Cognitive/Neuro/Behavioral WDL    Cognitive/Neuro/Behavioral WDL WDL

## 2023-12-26 NOTE — ED NOTES
Bed: ST02  Expected date:   Expected time:   Means of arrival:   Comments:  Quentin  73 F back pain/would like MD at the door  [phone]

## 2023-12-27 ENCOUNTER — APPOINTMENT (OUTPATIENT)
Dept: CT IMAGING | Facility: CLINIC | Age: 73
End: 2023-12-27
Attending: EMERGENCY MEDICINE
Payer: COMMERCIAL

## 2023-12-27 ENCOUNTER — HOSPITAL ENCOUNTER (EMERGENCY)
Facility: CLINIC | Age: 73
Discharge: HOME | End: 2023-12-27
Attending: EMERGENCY MEDICINE | Admitting: EMERGENCY MEDICINE
Payer: COMMERCIAL

## 2023-12-27 VITALS
RESPIRATION RATE: 18 BRPM | DIASTOLIC BLOOD PRESSURE: 96 MMHG | TEMPERATURE: 97.7 F | OXYGEN SATURATION: 100 % | SYSTOLIC BLOOD PRESSURE: 125 MMHG | HEART RATE: 77 BPM

## 2023-12-27 DIAGNOSIS — Z87.442 H/O RENAL CALCULI: ICD-10-CM

## 2023-12-27 DIAGNOSIS — R31.29 MICROSCOPIC HEMATURIA: ICD-10-CM

## 2023-12-27 DIAGNOSIS — R10.9 FLANK PAIN: ICD-10-CM

## 2023-12-27 LAB
ALBUMIN UR-MCNC: NEGATIVE MG/DL
ANION GAP SERPL CALCULATED.3IONS-SCNC: 10 MMOL/L (ref 7–15)
APPEARANCE UR: CLEAR
BASOPHILS # BLD AUTO: 0 10E3/UL (ref 0–0.2)
BASOPHILS NFR BLD AUTO: 0 %
BILIRUB UR QL STRIP: NEGATIVE
BUN SERPL-MCNC: 13.6 MG/DL (ref 8–23)
CALCIUM SERPL-MCNC: 8.7 MG/DL (ref 8.8–10.2)
CHLORIDE SERPL-SCNC: 108 MMOL/L (ref 98–107)
COLOR UR AUTO: ABNORMAL
CREAT SERPL-MCNC: 0.74 MG/DL (ref 0.51–0.95)
DEPRECATED HCO3 PLAS-SCNC: 25 MMOL/L (ref 22–29)
EGFRCR SERPLBLD CKD-EPI 2021: 84 ML/MIN/1.73M2
EOSINOPHIL # BLD AUTO: 0.1 10E3/UL (ref 0–0.7)
EOSINOPHIL NFR BLD AUTO: 2 %
ERYTHROCYTE [DISTWIDTH] IN BLOOD BY AUTOMATED COUNT: 12.7 % (ref 10–15)
GLUCOSE SERPL-MCNC: 109 MG/DL (ref 70–99)
GLUCOSE UR STRIP-MCNC: NEGATIVE MG/DL
HCT VFR BLD AUTO: 45.8 % (ref 35–47)
HGB BLD-MCNC: 15 G/DL (ref 11.7–15.7)
HGB UR QL STRIP: ABNORMAL
IMM GRANULOCYTES # BLD: 0 10E3/UL
IMM GRANULOCYTES NFR BLD: 0 %
KETONES UR STRIP-MCNC: NEGATIVE MG/DL
LEUKOCYTE ESTERASE UR QL STRIP: ABNORMAL
LYMPHOCYTES # BLD AUTO: 1.6 10E3/UL (ref 0.8–5.3)
LYMPHOCYTES NFR BLD AUTO: 30 %
MCH RBC QN AUTO: 31.8 PG (ref 26.5–33)
MCHC RBC AUTO-ENTMCNC: 32.8 G/DL (ref 31.5–36.5)
MCV RBC AUTO: 97 FL (ref 78–100)
MONOCYTES # BLD AUTO: 0.6 10E3/UL (ref 0–1.3)
MONOCYTES NFR BLD AUTO: 12 %
MUCOUS THREADS #/AREA URNS LPF: PRESENT /LPF
NEUTROPHILS # BLD AUTO: 2.9 10E3/UL (ref 1.6–8.3)
NEUTROPHILS NFR BLD AUTO: 56 %
NITRATE UR QL: NEGATIVE
NRBC # BLD AUTO: 0 10E3/UL
NRBC BLD AUTO-RTO: 0 /100
PH UR STRIP: 7 [PH] (ref 5–7)
PLATELET # BLD AUTO: 204 10E3/UL (ref 150–450)
POTASSIUM SERPL-SCNC: 3.8 MMOL/L (ref 3.4–5.3)
RBC # BLD AUTO: 4.72 10E6/UL (ref 3.8–5.2)
RBC URINE: 6 /HPF
SODIUM SERPL-SCNC: 143 MMOL/L (ref 135–145)
SP GR UR STRIP: 1.01 (ref 1–1.03)
UROBILINOGEN UR STRIP-MCNC: NORMAL MG/DL
WBC # BLD AUTO: 5.2 10E3/UL (ref 4–11)
WBC URINE: 3 /HPF

## 2023-12-27 PROCEDURE — 82310 ASSAY OF CALCIUM: CPT | Performed by: EMERGENCY MEDICINE

## 2023-12-27 PROCEDURE — 96361 HYDRATE IV INFUSION ADD-ON: CPT

## 2023-12-27 PROCEDURE — 99284 EMERGENCY DEPT VISIT MOD MDM: CPT | Mod: 25

## 2023-12-27 PROCEDURE — 36415 COLL VENOUS BLD VENIPUNCTURE: CPT | Performed by: EMERGENCY MEDICINE

## 2023-12-27 PROCEDURE — 74176 CT ABD & PELVIS W/O CONTRAST: CPT

## 2023-12-27 PROCEDURE — 85025 COMPLETE CBC W/AUTO DIFF WBC: CPT | Performed by: EMERGENCY MEDICINE

## 2023-12-27 PROCEDURE — 81001 URINALYSIS AUTO W/SCOPE: CPT | Performed by: EMERGENCY MEDICINE

## 2023-12-27 PROCEDURE — 96360 HYDRATION IV INFUSION INIT: CPT

## 2023-12-27 PROCEDURE — 258N000003 HC RX IP 258 OP 636: Performed by: EMERGENCY MEDICINE

## 2023-12-27 RX ORDER — MORPHINE SULFATE 4 MG/ML
4 INJECTION, SOLUTION INTRAMUSCULAR; INTRAVENOUS
Status: DISCONTINUED | OUTPATIENT
Start: 2023-12-27 | End: 2023-12-27 | Stop reason: HOSPADM

## 2023-12-27 RX ADMIN — SODIUM CHLORIDE 1000 ML: 9 INJECTION, SOLUTION INTRAVENOUS at 08:54

## 2023-12-27 ASSESSMENT — ACTIVITIES OF DAILY LIVING (ADL): ADLS_ACUITY_SCORE: 35

## 2023-12-27 NOTE — ED PROVIDER NOTES
"  History     Chief Complaint:  Flank Pain     The history is provided by the patient.      Brii Cook is a 73 year old female on Xarelto with a history of anxiety, PE, and colon cancer who presents to the emergency department for flank pain. The patient states that last night at 2000, she began experiencing a sudden onset of \"stabbing\" right flank pain. She notes that she has a history of nephrolithiasis, in which this pain \"feels similar.\" Denies the pain radiating to any part of her body. She adds that for a week, her urine has also began to develop an odor. Denies hematuria or other urinary symptoms. Denies fever, vomiting, or chills. Denies chest pain or respiratory symptoms. Denies leg swelling. Denies abdominal pain. She notes that a year ago, she had 3 renal infections. She reports that she presented to [facility] yesterday for right shoulder pain but her workup was unremarkable.    Independent Historian:   None - Patient Only    Review of External Notes:   Outpatient clinic notes reviewed.  ED visit from yesterday reviewed for likely unrelated issue.    Medications:    Lopressor  Xarelto  Wellbutrin XL    Past Medical History:    MUMTAZ  MDD  Spontaneous dissection of coronary artery  Acute pulmonary embolism without acute cor pulmonale  Benign neoplasm of sigmoid colon  Osteopenia  Recurrent corneal erosion  Nephrolithiasis     Past Surgical History:    Appendectomy  Lithotripsy  Left heart catheterization    Physical Exam   Patient Vitals for the past 24 hrs:   BP Temp Temp src Pulse Resp SpO2   12/27/23 0300 (!) 125/96 97.7  F (36.5  C) Temporal 77 18 100 %      Physical Exam  General: Elderly female sitting upright  Eyes: PERRL, Conjunctive within normal limits  ENT: Moist mucous membranes, oropharynx clear.   CV: Normal S1S2, no murmur, rub or gallop. Regular rate and rhythm  Resp: Clear to auscultation bilaterally, no wheezes, rales or rhonchi. Normal respiratory effort.  GI: Abdomen is " soft, nontender and nondistended. No palpable masses. No rebound or guarding. No CVA tenderness to percussion.  MSK: No edema. Nontender. Normal active range of motion.  Skin: Warm and dry. No rashes or lesions or ecchymoses on visible skin.  Neuro: Alert and oriented. Responds appropriately to all questions and commands. No focal findings appreciated. Normal muscle tone.  Psych: Normal mood and affect. Pleasant.     Emergency Department Course   Imaging:  CT Abdomen Pelvis w/o Contrast   Final Result   IMPRESSION:    1.  No acute findings in the abdomen and pelvis on noncontrast CT.      SALOMÓN LAIRD MD            SYSTEM ID:  [id number]         Report per radiology    Laboratory:  Labs Ordered and Resulted from Time of ED Arrival to Time of ED Departure   ROUTINE UA WITH MICROSCOPIC - Abnormal       Result Value    Color Urine Straw      Appearance Urine Clear      Glucose Urine Negative      Bilirubin Urine Negative      Ketones Urine Negative      Specific Gravity Urine 1.009      Blood Urine Small (*)     pH Urine 7.0      Protein Albumin Urine Negative      Urobilinogen Urine Normal      Nitrite Urine Negative      Leukocyte Esterase Urine Moderate (*)     Mucus Urine Present (*)     RBC Urine 6 (*)     WBC Urine 3     BASIC METABOLIC PANEL - Abnormal    Sodium 143      Potassium 3.8      Chloride 108 (*)     Carbon Dioxide (CO2) 25      Anion Gap 10      Urea Nitrogen 13.6      Creatinine 0.74      GFR Estimate 84      Calcium 8.7 (*)     Glucose 109 (*)    CBC WITH PLATELETS AND DIFFERENTIAL    WBC Count 5.2      RBC Count 4.72      Hemoglobin 15.0      Hematocrit 45.8      MCV 97      MCH 31.8      MCHC 32.8      RDW 12.7      Platelet Count 204      % Neutrophils 56      % Lymphocytes 30      % Monocytes 12      % Eosinophils 2      % Basophils 0      % Immature Granulocytes 0      NRBCs per 100 WBC 0      Absolute Neutrophils 2.9      Absolute Lymphocytes 1.6      Absolute Monocytes 0.6      Absolute  Eosinophils 0.1      Absolute Basophils 0.0      Absolute Immature Granulocytes 0.0      Absolute NRBCs 0.0        Emergency Department Course & Assessments:    Interventions:  Medications   morphine (PF) injection 4 mg (has no administration in time range)   sodium chloride 0.9% BOLUS 1,000 mL (1,000 mLs Intravenous $New Bag 12/27/23 0854)      Assessments:  0846 I obtained history and examined the patient as noted above.   1042 I discussed findings and discharge with the patient. All questions answered.  She continues to deny any ongoing symptoms.    Independent Interpretation (X-rays, CTs, rhythm strip):  Reviewed the patient CT abdomen pelvis.  Do not see evidence of obstructing ureteral stone.    Consultations/Discussion of Management or Tests:  None    Social Determinants of Health affecting care:   None    Disposition:  The patient was discharged to home.     Impression & Plan      Medical Decision Making:  Angie Perez is a 73-year-old female with history of kidney stones who is anticoagulated presents emergency department concerns for flank pain that was sudden onset and has improved over the hours that she was here waiting for assessment.  She currently has no symptoms.  She has microscopic hematuria which could be secondary to recently passed stone and/or Xarelto use.  Her symptoms certainly sound consistent with renal colic.  CT did not show any signs of ongoing ureteral stone or other acute pathology.  She is afebrile.  She has no signs of infection.  Creatinine is normal.  I feel comfortable at this point discharging her home with no apparent acute life-threatening or surgical process at this time.  She is recommended close follow-up as needed if that she has ongoing symptoms and reassessment here in the emergency department should her symptoms worsen.  She feels comfortable with plan.  All questions were answered prior to discharge.    Diagnosis:    ICD-10-CM    1. Flank pain  R10.9       2.  Microscopic hematuria  R31.29       3. H/O renal calculi  Z87.442         Discharge Medications:  New Prescriptions    No medications on file      Scribe Disclosure:  I, Sadi Vengeas, am serving as a scribe at 8:45 AM on 12/27/2023 to document services personally performed by Isabela Gore MD based on my observations and the provider's statements to me.     12/27/2023   Isabela Gore MD Jonkman, Tracy Dianne, MD  12/27/23 4204

## 2023-12-27 NOTE — DISCHARGE INSTRUCTIONS
You may have passed a kidney stone (small amount of red blood cells in your urine on arrival can suggest that). There are no abnormalities on the CT scan today to otherwise explain your symptoms.  At this point you are safe for discharge home, please follow-up with your primary care provider as needed.

## 2023-12-27 NOTE — ED TRIAGE NOTES
Pt comes in with what she thinks is a kidney stone.  Per pt she has had them in the past but at about 2000 she started having severe pain in the right flank area.  Pt denies blood in her urine or changes in urination.  Pt denies fever.  She rates her pain at a 8/10 and stabbing in quality.  ABCs are intact, pt is alert and oriented X4     Triage Assessment (Adult)       Row Name 12/27/23 0258          Triage Assessment    Airway WDL WDL        Respiratory WDL    Respiratory WDL WDL        Skin Circulation/Temperature WDL    Skin Circulation/Temperature WDL WDL        Cardiac WDL    Cardiac WDL WDL        Peripheral/Neurovascular WDL    Peripheral Neurovascular WDL WDL        Cognitive/Neuro/Behavioral WDL    Cognitive/Neuro/Behavioral WDL WDL

## 2024-04-04 ENCOUNTER — LAB REQUISITION (OUTPATIENT)
Dept: LAB | Facility: CLINIC | Age: 74
End: 2024-04-04
Payer: COMMERCIAL

## 2024-04-04 DIAGNOSIS — Z12.4 ENCOUNTER FOR SCREENING FOR MALIGNANT NEOPLASM OF CERVIX: ICD-10-CM

## 2024-04-04 PROCEDURE — G0145 SCR C/V CYTO,THINLAYER,RESCR: HCPCS | Mod: ORL | Performed by: OBSTETRICS & GYNECOLOGY

## 2024-04-04 PROCEDURE — 87624 HPV HI-RISK TYP POOLED RSLT: CPT | Mod: ORL | Performed by: OBSTETRICS & GYNECOLOGY

## 2024-04-08 LAB
BKR LAB AP GYN ADEQUACY: NORMAL
BKR LAB AP GYN INTERPRETATION: NORMAL
BKR LAB AP HPV REFLEX: NORMAL
BKR LAB AP LMP: NORMAL
BKR LAB AP PREVIOUS ABNL DX: NORMAL
BKR LAB AP PREVIOUS ABNORMAL: NORMAL
PATH REPORT.COMMENTS IMP SPEC: NORMAL
PATH REPORT.COMMENTS IMP SPEC: NORMAL
PATH REPORT.RELEVANT HX SPEC: NORMAL

## 2024-04-10 LAB
HUMAN PAPILLOMA VIRUS 16 DNA: NEGATIVE
HUMAN PAPILLOMA VIRUS 18 DNA: NEGATIVE
HUMAN PAPILLOMA VIRUS FINAL DIAGNOSIS: NORMAL
HUMAN PAPILLOMA VIRUS OTHER HR: NEGATIVE

## 2024-04-11 ENCOUNTER — LAB REQUISITION (OUTPATIENT)
Dept: LAB | Facility: CLINIC | Age: 74
End: 2024-04-11
Payer: COMMERCIAL

## 2024-04-11 DIAGNOSIS — R39.15 URGENCY OF URINATION: ICD-10-CM

## 2024-04-11 PROCEDURE — 87086 URINE CULTURE/COLONY COUNT: CPT | Mod: ORL | Performed by: OBSTETRICS & GYNECOLOGY

## 2024-04-11 PROCEDURE — 87186 SC STD MICRODIL/AGAR DIL: CPT | Mod: ORL | Performed by: OBSTETRICS & GYNECOLOGY

## 2024-04-12 LAB — BACTERIA UR CULT: ABNORMAL

## 2024-06-02 ENCOUNTER — HEALTH MAINTENANCE LETTER (OUTPATIENT)
Age: 74
End: 2024-06-02

## 2024-08-16 ENCOUNTER — HOSPITAL ENCOUNTER (EMERGENCY)
Facility: CLINIC | Age: 74
Discharge: HOME | End: 2024-08-16
Attending: EMERGENCY MEDICINE | Admitting: EMERGENCY MEDICINE
Payer: COMMERCIAL

## 2024-08-16 ENCOUNTER — APPOINTMENT (OUTPATIENT)
Dept: CT IMAGING | Facility: CLINIC | Age: 74
End: 2024-08-16
Attending: EMERGENCY MEDICINE
Payer: COMMERCIAL

## 2024-08-16 VITALS
RESPIRATION RATE: 19 BRPM | SYSTOLIC BLOOD PRESSURE: 137 MMHG | DIASTOLIC BLOOD PRESSURE: 90 MMHG | OXYGEN SATURATION: 97 % | BODY MASS INDEX: 23.56 KG/M2 | WEIGHT: 138 LBS | HEIGHT: 64 IN | HEART RATE: 68 BPM | TEMPERATURE: 98.9 F

## 2024-08-16 DIAGNOSIS — R07.89 CHEST PAIN, NON-CARDIAC: ICD-10-CM

## 2024-08-16 LAB
ANION GAP SERPL CALCULATED.3IONS-SCNC: 9 MMOL/L (ref 7–15)
BASOPHILS # BLD AUTO: 0 10E3/UL (ref 0–0.2)
BASOPHILS NFR BLD AUTO: 0 %
BUN SERPL-MCNC: 16.6 MG/DL (ref 8–23)
CALCIUM SERPL-MCNC: 9.1 MG/DL (ref 8.8–10.4)
CHLORIDE SERPL-SCNC: 106 MMOL/L (ref 98–107)
CREAT SERPL-MCNC: 0.86 MG/DL (ref 0.51–0.95)
D DIMER PPP FEU-MCNC: 0.45 UG/ML FEU (ref 0–0.5)
EGFRCR SERPLBLD CKD-EPI 2021: 70 ML/MIN/1.73M2
EOSINOPHIL # BLD AUTO: 0.3 10E3/UL (ref 0–0.7)
EOSINOPHIL NFR BLD AUTO: 5 %
ERYTHROCYTE [DISTWIDTH] IN BLOOD BY AUTOMATED COUNT: 13.5 % (ref 10–15)
GLUCOSE SERPL-MCNC: 112 MG/DL (ref 70–99)
HCO3 SERPL-SCNC: 24 MMOL/L (ref 22–29)
HCT VFR BLD AUTO: 39.7 % (ref 35–47)
HGB BLD-MCNC: 13.4 G/DL (ref 11.7–15.7)
HOLD SPECIMEN: NORMAL
HOLD SPECIMEN: NORMAL
IMM GRANULOCYTES # BLD: 0 10E3/UL
IMM GRANULOCYTES NFR BLD: 0 %
LYMPHOCYTES # BLD AUTO: 2 10E3/UL (ref 0.8–5.3)
LYMPHOCYTES NFR BLD AUTO: 39 %
MCH RBC QN AUTO: 32.5 PG (ref 26.5–33)
MCHC RBC AUTO-ENTMCNC: 33.8 G/DL (ref 31.5–36.5)
MCV RBC AUTO: 96 FL (ref 78–100)
MONOCYTES # BLD AUTO: 0.5 10E3/UL (ref 0–1.3)
MONOCYTES NFR BLD AUTO: 11 %
NEUTROPHILS # BLD AUTO: 2.3 10E3/UL (ref 1.6–8.3)
NEUTROPHILS NFR BLD AUTO: 45 %
NRBC # BLD AUTO: 0 10E3/UL
NRBC BLD AUTO-RTO: 0 /100
PLATELET # BLD AUTO: 224 10E3/UL (ref 150–450)
POTASSIUM SERPL-SCNC: 3.9 MMOL/L (ref 3.4–5.3)
RBC # BLD AUTO: 4.12 10E6/UL (ref 3.8–5.2)
SODIUM SERPL-SCNC: 139 MMOL/L (ref 135–145)
TROPONIN T SERPL HS-MCNC: 8 NG/L
TROPONIN T SERPL HS-MCNC: 9 NG/L
WBC # BLD AUTO: 5.1 10E3/UL (ref 4–11)

## 2024-08-16 PROCEDURE — 250N000013 HC RX MED GY IP 250 OP 250 PS 637: Performed by: EMERGENCY MEDICINE

## 2024-08-16 PROCEDURE — 250N000011 HC RX IP 250 OP 636: Performed by: EMERGENCY MEDICINE

## 2024-08-16 PROCEDURE — 99285 EMERGENCY DEPT VISIT HI MDM: CPT | Mod: 25

## 2024-08-16 PROCEDURE — 85379 FIBRIN DEGRADATION QUANT: CPT | Performed by: EMERGENCY MEDICINE

## 2024-08-16 PROCEDURE — 80048 BASIC METABOLIC PNL TOTAL CA: CPT | Performed by: EMERGENCY MEDICINE

## 2024-08-16 PROCEDURE — 85025 COMPLETE CBC W/AUTO DIFF WBC: CPT | Performed by: EMERGENCY MEDICINE

## 2024-08-16 PROCEDURE — 71260 CT THORAX DX C+: CPT

## 2024-08-16 PROCEDURE — 96375 TX/PRO/DX INJ NEW DRUG ADDON: CPT | Mod: 59

## 2024-08-16 PROCEDURE — 84484 ASSAY OF TROPONIN QUANT: CPT | Performed by: EMERGENCY MEDICINE

## 2024-08-16 PROCEDURE — 250N000009 HC RX 250: Performed by: EMERGENCY MEDICINE

## 2024-08-16 PROCEDURE — 36415 COLL VENOUS BLD VENIPUNCTURE: CPT | Performed by: EMERGENCY MEDICINE

## 2024-08-16 PROCEDURE — 96374 THER/PROPH/DIAG INJ IV PUSH: CPT | Mod: 59

## 2024-08-16 RX ORDER — METHYLPREDNISOLONE SODIUM SUCCINATE 125 MG/2ML
80 INJECTION, POWDER, LYOPHILIZED, FOR SOLUTION INTRAMUSCULAR; INTRAVENOUS ONCE
Status: COMPLETED | OUTPATIENT
Start: 2024-08-16 | End: 2024-08-16

## 2024-08-16 RX ORDER — DIPHENHYDRAMINE HYDROCHLORIDE 50 MG/ML
25 INJECTION INTRAMUSCULAR; INTRAVENOUS ONCE
Status: COMPLETED | OUTPATIENT
Start: 2024-08-16 | End: 2024-08-16

## 2024-08-16 RX ORDER — NITROGLYCERIN 0.4 MG/1
0.4 TABLET SUBLINGUAL EVERY 5 MIN PRN
Status: DISCONTINUED | OUTPATIENT
Start: 2024-08-16 | End: 2024-08-16 | Stop reason: HOSPADM

## 2024-08-16 RX ORDER — IOPAMIDOL 755 MG/ML
72 INJECTION, SOLUTION INTRAVASCULAR ONCE
Status: COMPLETED | OUTPATIENT
Start: 2024-08-16 | End: 2024-08-16

## 2024-08-16 RX ORDER — ACETAMINOPHEN 500 MG
1000 TABLET ORAL ONCE
Status: COMPLETED | OUTPATIENT
Start: 2024-08-16 | End: 2024-08-16

## 2024-08-16 RX ADMIN — DIPHENHYDRAMINE HYDROCHLORIDE 25 MG: 50 INJECTION INTRAMUSCULAR; INTRAVENOUS at 17:27

## 2024-08-16 RX ADMIN — SODIUM CHLORIDE 80 ML: 9 INJECTION, SOLUTION INTRAVENOUS at 18:05

## 2024-08-16 RX ADMIN — IOPAMIDOL 72 ML: 755 INJECTION, SOLUTION INTRAVENOUS at 18:05

## 2024-08-16 RX ADMIN — METHYLPREDNISOLONE SODIUM SUCCINATE 81.25 MG: 125 INJECTION, POWDER, FOR SOLUTION INTRAMUSCULAR; INTRAVENOUS at 17:27

## 2024-08-16 RX ADMIN — ACETAMINOPHEN 1000 MG: 500 TABLET, FILM COATED ORAL at 17:27

## 2024-08-16 ASSESSMENT — ACTIVITIES OF DAILY LIVING (ADL)
ADLS_ACUITY_SCORE: 37
ADLS_ACUITY_SCORE: 37

## 2024-08-16 ASSESSMENT — COLUMBIA-SUICIDE SEVERITY RATING SCALE - C-SSRS
2. HAVE YOU ACTUALLY HAD ANY THOUGHTS OF KILLING YOURSELF IN THE PAST MONTH?: NO
1. IN THE PAST MONTH, HAVE YOU WISHED YOU WERE DEAD OR WISHED YOU COULD GO TO SLEEP AND NOT WAKE UP?: NO
6. HAVE YOU EVER DONE ANYTHING, STARTED TO DO ANYTHING, OR PREPARED TO DO ANYTHING TO END YOUR LIFE?: NO

## 2024-08-16 NOTE — ED PROVIDER NOTES
Emergency Department Note      History of Present Illness     Chief Complaint   Chest Pain      HPI   Brii Cook is a 73 year old female  With history of pulmonary embolism and myocardial infraction (STEMI) who presents to the ED via EMS with  and daughter for evaluation of chest pain. The patient reports intermit chest pain that started on Wednesday while she was taking clothes out the nicole. She endorse headache and nausea that started last night. Adds tingling in her legs yesterday afternoon and weakness to low extremity around 2 PM today. 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 After her surgery she developed serve mild back pain. Adds she had pulmonary embolism that is resolved. The patient is on blood thinner. Brii took 81 MG aspirin at home before calling EMS. On route she was given nitroglycerin.  Denies cough, fever, shortness of breath, swelling/pain in lower extremity.     Independent Historian    and Daughter as detailed above.    Review of External Notes   5/6/23:   Cardiac cath    Lat 1st Mrg lesion is 90% stenosed.    Left ventricular filling pressures are mildly elevated.     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  The dissection is in the distal portion of the vessel, and the vessel is very tortuous.  There is no coronary atherosclerosis appreciated.   LVEDP is mildly elevated, 17mmHg.   A run of sustained monomorphic ventricular tachycardia was noted during the procedure which resolved without intervention and was not associated with symptoms or hemodynamic compromise.     I reviewed ED visits from 12/26/2023.  Presented with chest and shoulder pain.  Chest x-ray and troponin were negative.  Discharged home.    Reviewed ED visit from 12/27/2023.  Patient presented with flank pain at that time.  CT abdomen pelvis at that time was negative.      Past Medical History     Medical History and Problem List  "  Past Medical History:   Diagnosis Date    Depression     Pulmonary embolism (H)        Medications   buPROPion (WELLBUTRIN XL) 150 MG 24 hr tablet  MAGNESIUM PO  metoprolol tartrate (LOPRESSOR) 25 MG tablet  rivaroxaban ANTICOAGULANT (XARELTO) 20 MG TABS tablet        Surgical History   Past Surgical History:   Procedure Laterality Date    CV CORONARY ANGIOGRAM N/A 5/7/2023    Procedure: Coronary Angiogram;  Surgeon: Lily Darling MD;  Location:  [facility]    CV LEFT HEART CATH N/A 5/7/2023    Procedure: Left Heart Catheterization;  Surgeon: Lily Darling MD;  Location:  [facility]    NO HISTORY OF SURGERY         Physical Exam     Patient Vitals for the past 24 hrs:   BP Temp Temp src Pulse Resp SpO2 Height Weight   08/16/24 1700 -- -- -- -- 19 98 % -- --   08/16/24 1650 (!) 166/101 98.9  F (37.2  C) Temporal 69 17 -- 1.626 m (5' 4\") 62.6 kg (138 lb)     Physical Exam  General: alert, uncomfortable, in pain  HENT: mucous membranes moist  CV: bradycardic rate, regular rhythm, no m/r/g, radial pulses symmetric.  Resp: normal effort, clear throughout, no crackles or wheezing  GI: abdomen soft and nontender, no guarding  MSK: no bony tenderness  Skin: appropriately warm and dry  Extremities: no edema, calves non-tender  Neuro: alert, clear speech, oriented  Psych: anxious      Diagnostics     Lab Results   Labs Ordered and Resulted from Time of ED Arrival to Time of ED Departure   BASIC METABOLIC PANEL - Abnormal       Result Value    Sodium 139      Potassium 3.9      Chloride 106      Carbon Dioxide (CO2) 24      Anion Gap 9      Urea Nitrogen 16.6      Creatinine 0.86      GFR Estimate 70      Calcium 9.1      Glucose 112 (*)    TROPONIN T, HIGH SENSITIVITY - Normal    Troponin T, High Sensitivity 8     D DIMER QUANTITATIVE - Normal    D-Dimer Quantitative 0.45     CBC WITH PLATELETS AND DIFFERENTIAL    WBC Count 5.1      RBC Count 4.12      Hemoglobin 13.4      Hematocrit 39.7 "      MCV 96      MCH 32.5      MCHC 33.8      RDW 13.5      Platelet Count 224      % Neutrophils 45      % Lymphocytes 39      % Monocytes 11      % Eosinophils 5      % Basophils 0      % Immature Granulocytes 0      NRBCs per 100 WBC 0      Absolute Neutrophils 2.3      Absolute Lymphocytes 2.0      Absolute Monocytes 0.5      Absolute Eosinophils 0.3      Absolute Basophils 0.0      Absolute Immature Granulocytes 0.0      Absolute NRBCs 0.0         Imaging   CT Aortic Survey w Contrast   Final Result   IMPRESSION:   1.  No evidence of aortic aneurysm or dissection.             EKG   ECG taken at 1654, ECG read at 1724  Sinus rhythm with frequent premature ventricular complexes    No changes as compared to prior, dated 12/26/23.  Rate 72 bpm. VT interval 140 ms. QRS duration 82 ms. QT/QTc 394/431 ms. P-R-T axes 0 12 18.    Independent Interpretation   None    ED Course      Medications Administered   Medications   nitroGLYcerin (NITROSTAT) sublingual tablet 0.4 mg (has no administration in time range)   acetaminophen (TYLENOL) tablet 1,000 mg (1,000 mg Oral $Given 8/16/24 1727)   diphenhydrAMINE (BENADRYL) injection 25 mg (25 mg Intravenous $Given 8/16/24 1727)   methylPREDNISolone Na Suc (solu-MEDROL) injection 81.25 mg (81.25 mg Intravenous $Given 8/16/24 1727)   iopamidol (ISOVUE-370) solution 72 mL (72 mLs Intravenous $Given 8/16/24 1805)   Saline Flush (80 mLs Intravenous $Given 8/16/24 1805)       Procedures   Procedures     Discussion of Management   Cardiology    ED Course   ED Course as of 08/16/24 2019   Fri Aug 16, 2024   1706 I obtained history and examined the patient as noted above.     1718 Patient noted to have a contrast allergy listed as rash.  Will plan for pretreatment, then CT scan is soon as possible.   1916 I rechecked and updated the patient.    1927 I discussed the patient with Dr. Lambert with cardiology.  He says okay to go home if second trop negative, followup with her cardiologist.    1927 I consulted with Dr. Estrada from general cardiologist on the phone.    2011 I rechecked and updated the patient.        Additional Documentation  None    Medical Decision Making / Diagnosis     CMS Diagnoses: None    MIPS       None    [facility]   Brii Cook is a 73 year old female with history of spontaneous coronary artery dissection, PE no longer anticoagulated, presenting today with back pain as well as chest pain.  On exam, the patient was hypertensive, but with normal oxygen saturations.  EKG does not show any ischemic changes, and initial troponin is negative.  D-dimer is negative.  CTA aortogram demonstrates no dissection, large PE, pneumonia, or pneumothorax.  I reviewed the patient's case with the on-call cardiologist, who agrees with plans for discharge given pain is resolved, and troponins are negative x 2.  Patient is feeling reassured.  I had a discussion with the patient and her  that I can rule out acute MI, but they should have a low threshold to return to the ED given that this could be a dynamic process.  Patient actually has follow-up scheduled already with her cardiologist in 5 days.  In the meantime, return to the ED for recurrent pain, shortness of breath, fever, or for any other concerns.    Disposition   The patient was discharged.     Diagnosis     ICD-10-CM    1. Chest pain, non-cardiac  R07.89              Scribe Disclosure:  I, Isatu Gregg, am serving as a scribe at 5:21 PM on 8/16/2024 to document services personally performed by Isabela Ko MD based on my observations and the provider's statements to me.        Isabela Ko MD  08/17/24 2414

## 2024-08-16 NOTE — ED TRIAGE NOTES
Pt arrives with 2 days of intermit CP, feels into mid back.  May 2023 had a spontaneous dissection of L circumflex artery when they were in cath lab.  States pain feels similar.  Took 4 baby ASA at home, 1 nitroglycerin in rig, states not change to pain.    Off xarelto since June.     Triage Assessment (Adult)       Row Name 08/16/24 6804          Triage Assessment    Airway WDL WDL        Respiratory WDL    Respiratory WDL WDL        Skin Circulation/Temperature WDL    Skin Circulation/Temperature WDL WDL        Cardiac WDL    Cardiac WDL X;chest pain        Peripheral/Neurovascular WDL    Peripheral Neurovascular WDL WDL        Cognitive/Neuro/Behavioral WDL    Cognitive/Neuro/Behavioral WDL WDL

## 2024-08-16 NOTE — ED NOTES
Bed: ED25  Expected date:   Expected time:   Means of arrival:   Comments:  [place] [address] 73F chest pain x2 days, hx dissection, ECG normal ETA 2643

## 2024-08-17 NOTE — DISCHARGE INSTRUCTIONS
Your workup today was reassuring, with negative troponins, negative D-dimer test, and no dissection on your CT scan.  Your CT also did not show an alternative cause of your pain.  I think it is great that you have follow-up with your cardiologist this upcoming week.  In the meantime, return to the ED for any recurrent pain.  Your CT incidentally noted that you have to arteries going to each kidney, which is a normal variation.  You can discuss this further with primary care.    Discharge Instructions  Chest Pain    You have been seen today for chest pain or discomfort.  At this time, your provider has found no signs that your chest pain is due to a serious or life-threatening condition, (or you have declined more testing and/or admission to the hospital). However, sometimes there is a serious problem that does not show up right away. Your evaluation today may not be complete and you may need further testing and evaluation.     Generally, every Emergency Department visit should have a follow-up clinic visit with either a primary or a specialty clinic/provider. Please follow-up as instructed by your emergency provider today.  Return to the Emergency Department if:  Your chest pain changes, gets worse, starts to happen more often, or comes with less activity.  You are newly short of breath.  You get very weak or tired.  You pass out or faint.  You have any new symptoms, like fever, cough, numb legs, or you cough up blood.  You have anything else that worries you.    Until you follow-up with your regular provider, please do the following:  Take one aspirin daily unless you have an allergy or are told not to by your provider.  If a stress test appointment has been made, go to the appointment.  If you have questions, contact your regular provider.  Follow-up with your regular provider/clinic as directed; this is very important.    If you were given a prescription for medicine here today, be sure to read all of the  information (including the package insert) that comes with your prescription.  This will include important information about the medicine, its side effects, and any warnings that you need to know about.  The pharmacist who fills the prescription can provide more information and answer questions you may have about the medicine.  If you have questions or concerns that the pharmacist cannot address, please call or return to the Emergency Department.       Remember that you can always come back to the Emergency Department if you are not able to see your regular provider in the amount of time listed above, if you get any new symptoms, or if there is anything that worries you.

## 2024-10-15 ENCOUNTER — APPOINTMENT (OUTPATIENT)
Dept: CT IMAGING | Facility: CLINIC | Age: 74
End: 2024-10-15
Attending: EMERGENCY MEDICINE
Payer: COMMERCIAL

## 2024-10-15 ENCOUNTER — HOSPITAL ENCOUNTER (EMERGENCY)
Facility: CLINIC | Age: 74
Discharge: HOME | End: 2024-10-15
Attending: EMERGENCY MEDICINE | Admitting: EMERGENCY MEDICINE
Payer: COMMERCIAL

## 2024-10-15 VITALS
BODY MASS INDEX: 24.02 KG/M2 | RESPIRATION RATE: 18 BRPM | DIASTOLIC BLOOD PRESSURE: 87 MMHG | HEIGHT: 65 IN | WEIGHT: 144.18 LBS | TEMPERATURE: 98.2 F | HEART RATE: 63 BPM | OXYGEN SATURATION: 98 % | SYSTOLIC BLOOD PRESSURE: 139 MMHG

## 2024-10-15 DIAGNOSIS — R10.9 RIGHT FLANK PAIN: ICD-10-CM

## 2024-10-15 LAB
ALBUMIN UR-MCNC: NEGATIVE MG/DL
ANION GAP SERPL CALCULATED.3IONS-SCNC: 11 MMOL/L (ref 7–15)
APPEARANCE UR: CLEAR
BACTERIA #/AREA URNS HPF: ABNORMAL /HPF
BASOPHILS # BLD AUTO: 0 10E3/UL (ref 0–0.2)
BASOPHILS NFR BLD AUTO: 0 %
BILIRUB UR QL STRIP: NEGATIVE
BUN SERPL-MCNC: 23.2 MG/DL (ref 8–23)
CALCIUM SERPL-MCNC: 9.3 MG/DL (ref 8.8–10.4)
CHLORIDE SERPL-SCNC: 107 MMOL/L (ref 98–107)
COLOR UR AUTO: ABNORMAL
CREAT SERPL-MCNC: 1.02 MG/DL (ref 0.51–0.95)
D DIMER PPP FEU-MCNC: 0.34 UG/ML FEU (ref 0–0.5)
EGFRCR SERPLBLD CKD-EPI 2021: 57 ML/MIN/1.73M2
EOSINOPHIL # BLD AUTO: 0.2 10E3/UL (ref 0–0.7)
EOSINOPHIL NFR BLD AUTO: 3 %
ERYTHROCYTE [DISTWIDTH] IN BLOOD BY AUTOMATED COUNT: 12.7 % (ref 10–15)
GLUCOSE SERPL-MCNC: 99 MG/DL (ref 70–99)
GLUCOSE UR STRIP-MCNC: NEGATIVE MG/DL
HCO3 SERPL-SCNC: 24 MMOL/L (ref 22–29)
HCT VFR BLD AUTO: 42.2 % (ref 35–47)
HGB BLD-MCNC: 13.9 G/DL (ref 11.7–15.7)
HGB UR QL STRIP: ABNORMAL
HOLD SPECIMEN: NORMAL
HOLD SPECIMEN: NORMAL
IMM GRANULOCYTES # BLD: 0 10E3/UL
IMM GRANULOCYTES NFR BLD: 0 %
KETONES UR STRIP-MCNC: NEGATIVE MG/DL
LEUKOCYTE ESTERASE UR QL STRIP: NEGATIVE
LYMPHOCYTES # BLD AUTO: 1.8 10E3/UL (ref 0.8–5.3)
LYMPHOCYTES NFR BLD AUTO: 34 %
MCH RBC QN AUTO: 31.5 PG (ref 26.5–33)
MCHC RBC AUTO-ENTMCNC: 32.9 G/DL (ref 31.5–36.5)
MCV RBC AUTO: 96 FL (ref 78–100)
MONOCYTES # BLD AUTO: 0.5 10E3/UL (ref 0–1.3)
MONOCYTES NFR BLD AUTO: 10 %
NEUTROPHILS # BLD AUTO: 2.7 10E3/UL (ref 1.6–8.3)
NEUTROPHILS NFR BLD AUTO: 52 %
NITRATE UR QL: NEGATIVE
NRBC # BLD AUTO: 0 10E3/UL
NRBC BLD AUTO-RTO: 0 /100
PH UR STRIP: 6.5 [PH] (ref 5–7)
PLATELET # BLD AUTO: 220 10E3/UL (ref 150–450)
POTASSIUM SERPL-SCNC: 4.1 MMOL/L (ref 3.4–5.3)
RBC # BLD AUTO: 4.41 10E6/UL (ref 3.8–5.2)
RBC URINE: 0 /HPF
SODIUM SERPL-SCNC: 142 MMOL/L (ref 135–145)
SP GR UR STRIP: 1 (ref 1–1.03)
SQUAMOUS EPITHELIAL: <1 /HPF
TROPONIN T SERPL HS-MCNC: <6 NG/L
UROBILINOGEN UR STRIP-MCNC: NORMAL MG/DL
WBC # BLD AUTO: 5.2 10E3/UL (ref 4–11)
WBC CLUMPS #/AREA URNS HPF: PRESENT /HPF
WBC URINE: <1 /HPF

## 2024-10-15 PROCEDURE — 96374 THER/PROPH/DIAG INJ IV PUSH: CPT

## 2024-10-15 PROCEDURE — 99285 EMERGENCY DEPT VISIT HI MDM: CPT | Mod: 25

## 2024-10-15 PROCEDURE — 81001 URINALYSIS AUTO W/SCOPE: CPT | Performed by: EMERGENCY MEDICINE

## 2024-10-15 PROCEDURE — 250N000011 HC RX IP 250 OP 636: Performed by: EMERGENCY MEDICINE

## 2024-10-15 PROCEDURE — 85025 COMPLETE CBC W/AUTO DIFF WBC: CPT | Performed by: EMERGENCY MEDICINE

## 2024-10-15 PROCEDURE — 84484 ASSAY OF TROPONIN QUANT: CPT | Performed by: EMERGENCY MEDICINE

## 2024-10-15 PROCEDURE — 80048 BASIC METABOLIC PNL TOTAL CA: CPT | Performed by: EMERGENCY MEDICINE

## 2024-10-15 PROCEDURE — 93005 ELECTROCARDIOGRAM TRACING: CPT

## 2024-10-15 PROCEDURE — 36415 COLL VENOUS BLD VENIPUNCTURE: CPT | Performed by: EMERGENCY MEDICINE

## 2024-10-15 PROCEDURE — 74176 CT ABD & PELVIS W/O CONTRAST: CPT

## 2024-10-15 PROCEDURE — 85379 FIBRIN DEGRADATION QUANT: CPT | Performed by: EMERGENCY MEDICINE

## 2024-10-15 PROCEDURE — 85004 AUTOMATED DIFF WBC COUNT: CPT | Performed by: EMERGENCY MEDICINE

## 2024-10-15 PROCEDURE — 96375 TX/PRO/DX INJ NEW DRUG ADDON: CPT

## 2024-10-15 RX ORDER — KETOROLAC TROMETHAMINE 15 MG/ML
15 INJECTION, SOLUTION INTRAMUSCULAR; INTRAVENOUS ONCE
Status: COMPLETED | OUTPATIENT
Start: 2024-10-15 | End: 2024-10-15

## 2024-10-15 RX ORDER — ONDANSETRON 2 MG/ML
8 INJECTION INTRAMUSCULAR; INTRAVENOUS ONCE
Status: COMPLETED | OUTPATIENT
Start: 2024-10-15 | End: 2024-10-15

## 2024-10-15 RX ADMIN — KETOROLAC TROMETHAMINE 15 MG: 15 INJECTION, SOLUTION INTRAMUSCULAR; INTRAVENOUS at 18:16

## 2024-10-15 RX ADMIN — ONDANSETRON 8 MG: 2 INJECTION INTRAMUSCULAR; INTRAVENOUS at 18:13

## 2024-10-15 ASSESSMENT — ACTIVITIES OF DAILY LIVING (ADL)
ADLS_ACUITY_SCORE: 37
ADLS_ACUITY_SCORE: 37
ADLS_ACUITY_SCORE: 35
ADLS_ACUITY_SCORE: 37

## 2024-10-15 ASSESSMENT — COLUMBIA-SUICIDE SEVERITY RATING SCALE - C-SSRS
2. HAVE YOU ACTUALLY HAD ANY THOUGHTS OF KILLING YOURSELF IN THE PAST MONTH?: NO
6. HAVE YOU EVER DONE ANYTHING, STARTED TO DO ANYTHING, OR PREPARED TO DO ANYTHING TO END YOUR LIFE?: NO
1. IN THE PAST MONTH, HAVE YOU WISHED YOU WERE DEAD OR WISHED YOU COULD GO TO SLEEP AND NOT WAKE UP?: NO

## 2024-10-15 NOTE — ED TRIAGE NOTES
Pt having right flank pain that started about 1 hour ago. States she has had kidney stones in the past and this feels similar. Denies urinary symptoms.

## 2024-10-15 NOTE — ED NOTES
Report from nurses in main states patient urine was initially reported incorrectly but it now correct in the chart.

## 2024-10-15 NOTE — ED PROVIDER NOTES
"  Emergency Department Note      History of Present Illness     Chief Complaint   Flank Pain      HPI     Brii Cook is a 74 year old female with a history as noted below who presents to the ED today for evaluation of flank pain. The patient reports she had pain in her right shoulder yesterday that she frequently experiences and has a prescription for diazepam. This relieved her shoulder pain, but around 1200 today, she started to have waves of right flank pain. She hasn't been able to find a comfortable position since then, and nothing makes the pain better or worse, though she's tried icing it. She reports some slight nausea associated with the pain and it sometimes radiates to her right lower quadrant. The patient denies any shortness of breath. She also denies any pain or increased frequency with urination. She mentions that she had a blood clot in her lungs in the past and went off blood thinners in June of this year. It is unknown where the blood clots came from, as no clotting disorder was found. She mentions that she had a coronary artery dissection in the past.     Independent Historian   None    Review of External Notes   None    Past Medical History     Medical History and Problem List   Depression  Pulmonary embolism  Anxiety  Benign neoplasm of sigmoid colon  Hemorrhoids   Kidney stone  Low bone density  Osteopenia  Osteoporosis  Polyp of colon  Spontaneous dissection of coronary artery  STEMI    Medications   Wellbutrin  Metoprolol succinate  Xarelto  Omnicef  Lexapro  Isosorbide mononitrate  Zofran  Gavilyte-G  Trazodone   Alendronate     Surgical History   CV coronary angiogram  CV left heart catheterization    Physical Exam     Patient Vitals for the past 24 hrs:   BP Temp Temp src Pulse Resp SpO2 Height Weight   10/15/24 1444 139/87 98.2  F (36.8  C) Temporal 63 18 98 % 1.651 m (5' 5\") 65.4 kg (144 lb 2.9 oz)     Physical Exam      HEENT:    Oropharynx is moist  Eyes:    Conjunctiva " normal  Neck:     Supple, no meningismus.     CV:     Regular rate and rhythm.      No murmurs, rubs or gallops.       No unilateral leg swelling.       2+ radial pulses bilateral.       No lower extremity edema.  PULM:    Clear to auscultation bilateral.       No respiratory distress.      Good air exchange.     No rales or wheezing.     No stridor.  ABD:    Soft, non-tender, non-distended.       No pulsatile masses.       No rebound, guarding or rigidity.     No CVA tenderness  MSK:     No gross deformity to all four extremities.   LYMPH:   No cervical lymphadenopathy.  NEURO:   Alert. Good muscle tone, no atrophy.  Skin:    Warm, dry and intact.    Psych:    Mood is good and affect is appropriate.      Diagnostics     Lab Results   Labs Ordered and Resulted from Time of ED Arrival to Time of ED Departure   ROUTINE UA WITH MICROSCOPIC - Abnormal       Result Value    Color Urine Straw      Appearance Urine Clear      Glucose Urine Negative      Bilirubin Urine Negative      Ketones Urine Negative      Specific Gravity Urine 1.004      Blood Urine Trace (*)     pH Urine 6.5      Protein Albumin Urine Negative      Urobilinogen Urine Normal      Nitrite Urine Negative      Leukocyte Esterase Urine Negative      Bacteria Urine Few (*)     WBC Clumps Urine Present (*)     RBC Urine 0      WBC Urine <1      Squamous Epithelials Urine <1     BASIC METABOLIC PANEL - Abnormal    Sodium 142      Potassium 4.1      Chloride 107      Carbon Dioxide (CO2) 24      Anion Gap 11      Urea Nitrogen 23.2 (*)     Creatinine 1.02 (*)     GFR Estimate 57 (*)     Calcium 9.3      Glucose 99     TROPONIN T, HIGH SENSITIVITY - Normal    Troponin T, High Sensitivity <6     D DIMER QUANTITATIVE - Normal    D-Dimer Quantitative 0.34     CBC WITH PLATELETS AND DIFFERENTIAL    WBC Count 5.2      RBC Count 4.41      Hemoglobin 13.9      Hematocrit 42.2      MCV 96      MCH 31.5      MCHC 32.9      RDW 12.7      Platelet Count 220      %  Neutrophils 52      % Lymphocytes 34      % Monocytes 10      % Eosinophils 3      % Basophils 0      % Immature Granulocytes 0      NRBCs per 100 WBC 0      Absolute Neutrophils 2.7      Absolute Lymphocytes 1.8      Absolute Monocytes 0.5      Absolute Eosinophils 0.2      Absolute Basophils 0.0      Absolute Immature Granulocytes 0.0      Absolute NRBCs 0.0         Imaging   Abd/pelvis CT no contrast - Stone Protocol   Final Result   IMPRESSION:    1.  Punctate calyceal tip calcification left upper pole. No ureteral calculus or hydronephrosis.   2.  Colonic diverticulosis.   3.  Otherwise negative noncontrast CT abdomen and pelvis.             EKG   ECG taken at 1803, ECG read at 1809  Sinus rhythm with frequent premature ventricular complexes  Nonspecific ST and T wave abnormality    No significant change as compared to prior, dated 12/26/23.  Rate 69 bpm. [place] interval 164 ms. QRS duration 78 ms. QT/QTc 388/415 ms. P-R-T axes 43 34 42.    Independent Interpretation   None    ED Course      Medications Administered   Medications   ketorolac (TORADOL) injection 15 mg (15 mg Intravenous $Given 10/15/24 1816)   ondansetron (ZOFRAN) injection 8 mg (8 mg Intravenous $Given 10/15/24 1813)       Procedures   Procedures     Discussion of Management   None    ED Course   ED Course as of 10/16/24 0014   Tue Oct 15, 2024   1755 I obtained history and examined the patient as noted above.    2108 I rechecked and updated the patient.        Additional Documentation  None    Medical Decision Making / Diagnosis       DAV Cook is a 74 year old female presents with primary concerns of right flank pain but also reported right shoulder pain.  She has a history of spontaneous coronary artery dissection.  EKG was performed to evaluate for atypical ACS.  EKG without ischemic changes.  Troponin within normal limits.  No indication for serial enzymes.  She also has a history of PE and no longer anticoagulated.  Low  suspicion for lower lobe PE.  D-dimer within normal limits thus no indication for CT scan of the chest.  Urinalysis is not consistent with infection to signal pyelonephritis.  Basic laboratory studies were otherwise unremarkable.  CT scan of the abdomen was performed to evaluate for recurrent ureteral stone.  She has a stone within the right kidney but otherwise unremarkable.  No acute pathology noted on CT scan to account for symptoms.  Differential diagnosis would include recently passed ureteral stone, muscle strain with spasm, disc herniation, nerve impingement.  Patient safe for discharge home with ibuprofen, Tylenol and continue use of her home diazepam.  Return to ED for any worsening symptoms.    Disposition   The patient was discharged.     Diagnosis     ICD-10-CM    1. Right flank pain  R10.9            Discharge Medications   Discharge Medication List as of 10/15/2024  9:14 PM            Scribe Disclosure:  I, Marisel Nielson, am serving as a scribe at 6:13 PM on 10/15/2024 to document services personally performed by Dwight Mao MD based on my observations and the provider's statements to me.        Dwight Mao MD  10/16/24 0015

## 2024-10-16 LAB
ATRIAL RATE - MUSE: 69 BPM
DIASTOLIC BLOOD PRESSURE - MUSE: NORMAL MMHG
INTERPRETATION ECG - MUSE: NORMAL
P AXIS - MUSE: 43 DEGREES
PR INTERVAL - MUSE: 164 MS
QRS DURATION - MUSE: 78 MS
QT - MUSE: 388 MS
QTC - MUSE: 415 MS
R AXIS - MUSE: 34 DEGREES
SYSTOLIC BLOOD PRESSURE - MUSE: NORMAL MMHG
T AXIS - MUSE: 42 DEGREES
VENTRICULAR RATE- MUSE: 69 BPM

## 2025-07-27 ENCOUNTER — HEALTH MAINTENANCE LETTER (OUTPATIENT)
Age: 75
End: 2025-07-27

## (undated) DEVICE — SLEEVE TR BAND RADIAL COMPRESSION DEVICE 24CM TRB24-REG

## (undated) DEVICE — KIT HAND CONTROL ANGIOTOUCH ACIST 65CM AT-P65

## (undated) DEVICE — MANIFOLD KIT ANGIO AUTOMATED 014613

## (undated) DEVICE — TOTE ANGIO CORP PC15AT SAN32CC83O

## (undated) DEVICE — DEFIB PRO-PADZ LVP LQD GEL ADULT 8900-2105-01

## (undated) DEVICE — INTRO GLIDESHEATH SLENDER 6FR 10X45CM 60-1060

## (undated) DEVICE — CATH ANGIO INFINITI JR4 4FRX100CM 538421

## (undated) DEVICE — RAD G/W INQWIRE .035X260CM J-TIP EXCHANGE IQ35F260J1O5RS

## (undated) DEVICE — 5FR X 100CM INFINITI TL DIAGNOSTIC CATHETER, JUDKINS LEFT CORONARY, JL 3.5, FEMORAL SELECTIVE THRULUMEN, SMALL, 0.038IN MAX GUIDEWIRE (EA/1)

## (undated) RX ORDER — LIDOCAINE HYDROCHLORIDE 10 MG/ML
INJECTION, SOLUTION EPIDURAL; INFILTRATION; INTRACAUDAL; PERINEURAL
Status: DISPENSED
Start: 2023-05-07

## (undated) RX ORDER — NITROGLYCERIN 5 MG/ML
VIAL (ML) INTRAVENOUS
Status: DISPENSED
Start: 2023-05-07

## (undated) RX ORDER — VERAPAMIL HYDROCHLORIDE 2.5 MG/ML
INJECTION, SOLUTION INTRAVENOUS
Status: DISPENSED
Start: 2023-05-07

## (undated) RX ORDER — HEPARIN SODIUM 200 [USP'U]/100ML
INJECTION, SOLUTION INTRAVENOUS
Status: DISPENSED
Start: 2023-05-07

## (undated) RX ORDER — HEPARIN SODIUM 1000 [USP'U]/ML
INJECTION, SOLUTION INTRAVENOUS; SUBCUTANEOUS
Status: DISPENSED
Start: 2023-05-07

## (undated) RX ORDER — FENTANYL CITRATE 50 UG/ML
INJECTION, SOLUTION INTRAMUSCULAR; INTRAVENOUS
Status: DISPENSED
Start: 2023-05-07